# Patient Record
Sex: MALE | Race: WHITE | NOT HISPANIC OR LATINO | URBAN - METROPOLITAN AREA
[De-identification: names, ages, dates, MRNs, and addresses within clinical notes are randomized per-mention and may not be internally consistent; named-entity substitution may affect disease eponyms.]

---

## 2023-08-14 LAB
ALBUMIN (MG/L) IN URINE: 22.7 MG/L
ALBUMIN/CREATININE (UG/MG) IN URINE: 10.8 UG/MG CRT (ref 0–30)
CREATININE (MG/DL) IN URINE: 211 MG/DL (ref 20–370)
THYROTROPIN (MIU/L) IN SER/PLAS BY DETECTION LIMIT <= 0.05 MIU/L: 1.49 MIU/L (ref 0.44–3.98)

## 2023-11-20 ENCOUNTER — APPOINTMENT (OUTPATIENT)
Dept: PRIMARY CARE | Facility: CLINIC | Age: 68
End: 2023-11-20
Payer: MEDICARE

## 2023-12-01 ENCOUNTER — OFFICE VISIT (OUTPATIENT)
Dept: PRIMARY CARE | Facility: CLINIC | Age: 68
End: 2023-12-01
Payer: MEDICARE

## 2023-12-01 VITALS
HEART RATE: 76 BPM | SYSTOLIC BLOOD PRESSURE: 118 MMHG | RESPIRATION RATE: 16 BRPM | DIASTOLIC BLOOD PRESSURE: 80 MMHG | BODY MASS INDEX: 43.71 KG/M2 | WEIGHT: 277 LBS | TEMPERATURE: 97.4 F

## 2023-12-01 DIAGNOSIS — E03.9 HYPOTHYROIDISM, UNSPECIFIED TYPE: Primary | ICD-10-CM

## 2023-12-01 DIAGNOSIS — E66.01 CLASS 3 SEVERE OBESITY DUE TO EXCESS CALORIES WITH SERIOUS COMORBIDITY AND BODY MASS INDEX (BMI) OF 40.0 TO 44.9 IN ADULT (MULTI): ICD-10-CM

## 2023-12-01 DIAGNOSIS — E11.65 TYPE 2 DIABETES MELLITUS WITH HYPERGLYCEMIA, WITHOUT LONG-TERM CURRENT USE OF INSULIN (MULTI): ICD-10-CM

## 2023-12-01 LAB
POC FINGERSTICK BLOOD GLUCOSE: 125 MG/DL (ref 70–100)
POC HEMOGLOBIN A1C: 6.6 % (ref 4.2–6.5)

## 2023-12-01 PROCEDURE — 1159F MED LIST DOCD IN RCRD: CPT | Performed by: NURSE PRACTITIONER

## 2023-12-01 PROCEDURE — 3079F DIAST BP 80-89 MM HG: CPT | Performed by: NURSE PRACTITIONER

## 2023-12-01 PROCEDURE — 99213 OFFICE O/P EST LOW 20 MIN: CPT | Performed by: NURSE PRACTITIONER

## 2023-12-01 PROCEDURE — 1036F TOBACCO NON-USER: CPT | Performed by: NURSE PRACTITIONER

## 2023-12-01 PROCEDURE — 83036 HEMOGLOBIN GLYCOSYLATED A1C: CPT | Performed by: NURSE PRACTITIONER

## 2023-12-01 PROCEDURE — 82962 GLUCOSE BLOOD TEST: CPT | Performed by: NURSE PRACTITIONER

## 2023-12-01 PROCEDURE — 4010F ACE/ARB THERAPY RXD/TAKEN: CPT | Performed by: NURSE PRACTITIONER

## 2023-12-01 PROCEDURE — 3074F SYST BP LT 130 MM HG: CPT | Performed by: NURSE PRACTITIONER

## 2023-12-01 RX ORDER — ERGOCALCIFEROL 1.25 MG/1
CAPSULE ORAL
COMMUNITY
Start: 2021-08-25 | End: 2023-12-22 | Stop reason: SDUPTHER

## 2023-12-01 RX ORDER — GLIMEPIRIDE 4 MG/1
4 TABLET ORAL 2 TIMES DAILY
COMMUNITY
End: 2023-12-07 | Stop reason: SDUPTHER

## 2023-12-01 RX ORDER — LISINOPRIL 40 MG/1
40 TABLET ORAL DAILY
COMMUNITY
End: 2023-12-07 | Stop reason: SDUPTHER

## 2023-12-01 RX ORDER — HYDROCHLOROTHIAZIDE 25 MG/1
25 TABLET ORAL DAILY
COMMUNITY
End: 2023-12-07 | Stop reason: SDUPTHER

## 2023-12-01 RX ORDER — BLOOD SUGAR DIAGNOSTIC
STRIP MISCELLANEOUS
COMMUNITY
Start: 2022-03-11

## 2023-12-01 RX ORDER — PIOGLITAZONEHYDROCHLORIDE 30 MG/1
30 TABLET ORAL NIGHTLY
COMMUNITY
End: 2023-12-07 | Stop reason: SDUPTHER

## 2023-12-01 RX ORDER — LEVOTHYROXINE SODIUM 137 UG/1
TABLET ORAL
COMMUNITY
End: 2023-12-21 | Stop reason: SDUPTHER

## 2023-12-01 ASSESSMENT — ENCOUNTER SYMPTOMS
CONSTITUTIONAL NEGATIVE: 1
PSYCHIATRIC NEGATIVE: 1
GASTROINTESTINAL NEGATIVE: 1
NEUROLOGICAL NEGATIVE: 1
CARDIOVASCULAR NEGATIVE: 1
RESPIRATORY NEGATIVE: 1

## 2023-12-01 NOTE — PROGRESS NOTES
Jalil Camp is a 68 y.o. here for a diabetic follow up exam.     Pt states they are doing well. Following a low carbohydrate diet and is active.     Up to date with eye and foot exams, pcp visits.     Pulse 76   Temp 36.3 °C (97.4 °F)   Resp 16   Wt 126 kg (277 lb)   BMI 43.71 kg/m²      Bs today fbs is 125    Meds:  Actos 30mg nightly   Glimepiride 4mg po bid    Wt today 277 last 276  Bmi 43.7  Last aic 7.2  Aic today 6.6    Lab Results   Component Value Date    HGBA1C 6.6 (A) 12/01/2023    HGBA1C 7.2 (H) 08/22/2022    HGBA1C 6.7 06/03/2021    HGBA1C 7.0 03/11/2021      /80   Pulse 76   Temp 36.3 °C (97.4 °F)   Resp 16   Wt 126 kg (277 lb)   BMI 43.71 kg/m²    Lab Results   Component Value Date    POCGLU 125 (A) 12/01/2023    HGBA1C 7.2 (H) 08/22/2022    HGBA1C 6.7 06/03/2021    HGBA1C 7.0 03/11/2021   (    Current Outpatient Medications on File Prior to Visit   Medication Sig Dispense Refill    blood sugar diagnostic (Accu-Chek Nai Plus test strp) strip       ergocalciferol (Vitamin D-2) 1.25 MG (59968 UT) capsule Take by mouth.      glimepiride (Amaryl) 4 mg tablet Take 1 tablet (4 mg) by mouth 2 times a day.      hydroCHLOROthiazide (HYDRODiuril) 25 mg tablet Take 1 tablet (25 mg) by mouth once daily.      levothyroxine (Synthroid, Levoxyl) 137 mcg tablet TAKE 1 TABLET BY MOUTH EVERY MORNING. On sundays only TAKE 1/2 (ONE-HALF) OF A TABLET. No food or drink for one hour after taking.      lisinopril 40 mg tablet Take 1 tablet (40 mg) by mouth once daily.      pioglitazone (Actos) 30 mg tablet Take 1 tablet (30 mg) by mouth once daily at bedtime.       No current facility-administered medications on file prior to visit.      Review of Systems   Constitutional: Negative.    Respiratory: Negative.     Cardiovascular: Negative.    Gastrointestinal: Negative.    Skin: Negative.    Neurological: Negative.    Psychiatric/Behavioral: Negative.     All other systems reviewed and are negative.        Physical Exam  Vitals and nursing note reviewed.   Constitutional:       Appearance: Normal appearance.   HENT:      Head: Normocephalic.   Eyes:      Pupils: Pupils are equal, round, and reactive to light.   Cardiovascular:      Rate and Rhythm: Normal rate and regular rhythm.      Heart sounds: Normal heart sounds.   Pulmonary:      Effort: Pulmonary effort is normal.      Breath sounds: Normal breath sounds.   Skin:     General: Skin is warm and dry.   Neurological:      General: No focal deficit present.      Mental Status: He is alert and oriented to person, place, and time. Mental status is at baseline.   Psychiatric:         Mood and Affect: Mood normal.         Behavior: Behavior normal.         Thought Content: Thought content normal.         Judgment: Judgment normal.        Problem List Items Addressed This Visit    None  Visit Diagnoses         Codes    Hypothyroidism, unspecified type    -  Primary E03.9    Relevant Orders    Albumin , Urine Random    TSH    Type 2 diabetes mellitus with hyperglycemia, without long-term current use of insulin (CMS/Formerly [facility])     E11.65    Relevant Orders    POCT Fingerstick Glucose manually resulted (Completed)    POCT glycosylated hemoglobin (Hb A1C) manually resulted (Completed)    Class 3 severe obesity due to excess calories with serious comorbidity and body mass index (BMI) of 40.0 to 44.9 in adult (CMS/Formerly [facility])     E66.01, Z68.41

## 2023-12-05 RX ORDER — ROSUVASTATIN CALCIUM 5 MG/1
5 TABLET, COATED ORAL DAILY
Qty: 90 TABLET | Refills: 1 | Status: SHIPPED | OUTPATIENT
Start: 2023-12-05 | End: 2023-12-22 | Stop reason: SDUPTHER

## 2023-12-07 DIAGNOSIS — E11.65 TYPE 2 DIABETES MELLITUS WITH HYPERGLYCEMIA, WITHOUT LONG-TERM CURRENT USE OF INSULIN (MULTI): Primary | ICD-10-CM

## 2023-12-07 RX ORDER — LISINOPRIL 40 MG/1
40 TABLET ORAL DAILY
Qty: 90 TABLET | Refills: 0 | Status: SHIPPED | OUTPATIENT
Start: 2023-12-07 | End: 2023-12-22 | Stop reason: SDUPTHER

## 2023-12-07 RX ORDER — HYDROCHLOROTHIAZIDE 25 MG/1
25 TABLET ORAL DAILY
Qty: 90 TABLET | Refills: 0 | Status: SHIPPED | OUTPATIENT
Start: 2023-12-07 | End: 2023-12-22 | Stop reason: SDUPTHER

## 2023-12-07 RX ORDER — GLIMEPIRIDE 4 MG/1
4 TABLET ORAL 2 TIMES DAILY
Qty: 180 TABLET | Refills: 0 | Status: SHIPPED | OUTPATIENT
Start: 2023-12-07 | End: 2023-12-22 | Stop reason: SDUPTHER

## 2023-12-07 RX ORDER — PIOGLITAZONEHYDROCHLORIDE 30 MG/1
30 TABLET ORAL NIGHTLY
Qty: 90 TABLET | Refills: 0 | Status: SHIPPED | OUTPATIENT
Start: 2023-12-07 | End: 2023-12-22 | Stop reason: SDUPTHER

## 2023-12-07 NOTE — TELEPHONE ENCOUNTER
GLIMEPIRIDE 4MG  LISINOPRIL 40MG  HYDROCHLOROTHIAZIDE 25MG  ACTOS 30MG    [facility]  NEEDS TODAY WILL BE OUT

## 2023-12-08 ENCOUNTER — LAB (OUTPATIENT)
Dept: LAB | Facility: LAB | Age: 68
End: 2023-12-08
Payer: MEDICARE

## 2023-12-08 DIAGNOSIS — E03.9 HYPOTHYROIDISM, UNSPECIFIED TYPE: ICD-10-CM

## 2023-12-08 LAB
CREAT UR-MCNC: 127.9 MG/DL (ref 20–370)
MICROALBUMIN UR-MCNC: 21.4 MG/L
MICROALBUMIN/CREAT UR: 16.7 UG/MG CREAT
TSH SERPL-ACNC: 1.98 MIU/L (ref 0.44–3.98)

## 2023-12-08 PROCEDURE — 82570 ASSAY OF URINE CREATININE: CPT

## 2023-12-08 PROCEDURE — 84443 ASSAY THYROID STIM HORMONE: CPT

## 2023-12-08 PROCEDURE — 82043 UR ALBUMIN QUANTITATIVE: CPT

## 2023-12-08 PROCEDURE — 36415 COLL VENOUS BLD VENIPUNCTURE: CPT

## 2023-12-21 DIAGNOSIS — E03.9 HYPOTHYROIDISM, UNSPECIFIED TYPE: Primary | ICD-10-CM

## 2023-12-21 RX ORDER — LEVOTHYROXINE SODIUM 137 UG/1
TABLET ORAL
Qty: 90 TABLET | Refills: 0 | Status: SHIPPED | OUTPATIENT
Start: 2023-12-21 | End: 2023-12-22 | Stop reason: SDUPTHER

## 2023-12-22 DIAGNOSIS — E11.65 TYPE 2 DIABETES MELLITUS WITH HYPERGLYCEMIA, WITHOUT LONG-TERM CURRENT USE OF INSULIN (MULTI): ICD-10-CM

## 2023-12-22 DIAGNOSIS — E03.9 HYPOTHYROIDISM, UNSPECIFIED TYPE: ICD-10-CM

## 2023-12-22 RX ORDER — GLIMEPIRIDE 4 MG/1
4 TABLET ORAL 2 TIMES DAILY
Qty: 180 TABLET | Refills: 2 | Status: SHIPPED | OUTPATIENT
Start: 2023-12-22 | End: 2024-03-01 | Stop reason: SDUPTHER

## 2023-12-22 RX ORDER — LEVOTHYROXINE SODIUM 137 UG/1
TABLET ORAL
Qty: 90 TABLET | Refills: 2 | Status: SHIPPED | OUTPATIENT
Start: 2023-12-22 | End: 2024-03-01 | Stop reason: SDUPTHER

## 2023-12-22 RX ORDER — PIOGLITAZONEHYDROCHLORIDE 30 MG/1
30 TABLET ORAL NIGHTLY
Qty: 90 TABLET | Refills: 2 | Status: SHIPPED | OUTPATIENT
Start: 2023-12-22 | End: 2024-03-01 | Stop reason: SDUPTHER

## 2023-12-22 RX ORDER — HYDROCHLOROTHIAZIDE 25 MG/1
25 TABLET ORAL DAILY
Qty: 90 TABLET | Refills: 2 | Status: SHIPPED | OUTPATIENT
Start: 2023-12-22 | End: 2024-03-01 | Stop reason: SDUPTHER

## 2023-12-22 RX ORDER — ROSUVASTATIN CALCIUM 5 MG/1
5 TABLET, COATED ORAL DAILY
Qty: 90 TABLET | Refills: 2 | Status: SHIPPED | OUTPATIENT
Start: 2023-12-22 | End: 2024-03-01 | Stop reason: SDUPTHER

## 2023-12-22 RX ORDER — LISINOPRIL 40 MG/1
40 TABLET ORAL DAILY
Qty: 90 TABLET | Refills: 2 | Status: SHIPPED | OUTPATIENT
Start: 2023-12-22 | End: 2024-03-01 | Stop reason: SDUPTHER

## 2023-12-22 RX ORDER — ERGOCALCIFEROL 1.25 MG/1
50000 CAPSULE ORAL
Qty: 12 CAPSULE | Refills: 2 | Status: SHIPPED | OUTPATIENT
Start: 2023-12-22 | End: 2024-03-01 | Stop reason: SDUPTHER

## 2024-03-01 ENCOUNTER — OFFICE VISIT (OUTPATIENT)
Dept: PRIMARY CARE | Facility: CLINIC | Age: 69
End: 2024-03-01
Payer: MEDICARE

## 2024-03-01 VITALS
WEIGHT: 281 LBS | HEART RATE: 75 BPM | BODY MASS INDEX: 41.62 KG/M2 | TEMPERATURE: 97.5 F | DIASTOLIC BLOOD PRESSURE: 80 MMHG | HEIGHT: 69 IN | SYSTOLIC BLOOD PRESSURE: 120 MMHG

## 2024-03-01 DIAGNOSIS — E66.01 CLASS 3 SEVERE OBESITY DUE TO EXCESS CALORIES WITH SERIOUS COMORBIDITY AND BODY MASS INDEX (BMI) OF 40.0 TO 44.9 IN ADULT (MULTI): ICD-10-CM

## 2024-03-01 DIAGNOSIS — E03.9 HYPOTHYROIDISM, UNSPECIFIED TYPE: ICD-10-CM

## 2024-03-01 DIAGNOSIS — E11.65 TYPE 2 DIABETES MELLITUS WITH HYPERGLYCEMIA, WITHOUT LONG-TERM CURRENT USE OF INSULIN (MULTI): Primary | ICD-10-CM

## 2024-03-01 LAB
POC FINGERSTICK BLOOD GLUCOSE: 158 MG/DL (ref 70–100)
POC HEMOGLOBIN A1C: 6.9 % (ref 4.2–6.5)

## 2024-03-01 PROCEDURE — 4010F ACE/ARB THERAPY RXD/TAKEN: CPT | Performed by: NURSE PRACTITIONER

## 2024-03-01 PROCEDURE — 3079F DIAST BP 80-89 MM HG: CPT | Performed by: NURSE PRACTITIONER

## 2024-03-01 PROCEDURE — 3074F SYST BP LT 130 MM HG: CPT | Performed by: NURSE PRACTITIONER

## 2024-03-01 PROCEDURE — 1160F RVW MEDS BY RX/DR IN RCRD: CPT | Performed by: NURSE PRACTITIONER

## 2024-03-01 PROCEDURE — 3008F BODY MASS INDEX DOCD: CPT | Performed by: NURSE PRACTITIONER

## 2024-03-01 PROCEDURE — 83036 HEMOGLOBIN GLYCOSYLATED A1C: CPT | Performed by: NURSE PRACTITIONER

## 2024-03-01 PROCEDURE — 99213 OFFICE O/P EST LOW 20 MIN: CPT | Performed by: NURSE PRACTITIONER

## 2024-03-01 PROCEDURE — 82962 GLUCOSE BLOOD TEST: CPT | Performed by: NURSE PRACTITIONER

## 2024-03-01 PROCEDURE — 1159F MED LIST DOCD IN RCRD: CPT | Performed by: NURSE PRACTITIONER

## 2024-03-01 PROCEDURE — 1036F TOBACCO NON-USER: CPT | Performed by: NURSE PRACTITIONER

## 2024-03-01 RX ORDER — LISINOPRIL 40 MG/1
40 TABLET ORAL DAILY
Qty: 90 TABLET | Refills: 2 | Status: SHIPPED | OUTPATIENT
Start: 2024-03-01

## 2024-03-01 RX ORDER — PIOGLITAZONEHYDROCHLORIDE 30 MG/1
30 TABLET ORAL NIGHTLY
Qty: 90 TABLET | Refills: 2 | Status: SHIPPED | OUTPATIENT
Start: 2024-03-01

## 2024-03-01 RX ORDER — ERGOCALCIFEROL 1.25 MG/1
50000 CAPSULE ORAL
Qty: 12 CAPSULE | Refills: 2 | Status: SHIPPED | OUTPATIENT
Start: 2024-03-01

## 2024-03-01 RX ORDER — HYDROCHLOROTHIAZIDE 25 MG/1
25 TABLET ORAL DAILY
Qty: 90 TABLET | Refills: 2 | Status: SHIPPED | OUTPATIENT
Start: 2024-03-01

## 2024-03-01 RX ORDER — GLIMEPIRIDE 4 MG/1
4 TABLET ORAL 2 TIMES DAILY
Qty: 180 TABLET | Refills: 2 | Status: SHIPPED | OUTPATIENT
Start: 2024-03-01

## 2024-03-01 RX ORDER — LEVOTHYROXINE SODIUM 137 UG/1
TABLET ORAL
Qty: 90 TABLET | Refills: 2 | Status: SHIPPED | OUTPATIENT
Start: 2024-03-01

## 2024-03-01 RX ORDER — ROSUVASTATIN CALCIUM 5 MG/1
5 TABLET, COATED ORAL DAILY
Qty: 90 TABLET | Refills: 2 | Status: SHIPPED | OUTPATIENT
Start: 2024-03-01 | End: 2024-08-28

## 2024-03-01 ASSESSMENT — ENCOUNTER SYMPTOMS
ENDOCRINE NEGATIVE: 1
NEUROLOGICAL NEGATIVE: 1
CARDIOVASCULAR NEGATIVE: 1
PSYCHIATRIC NEGATIVE: 1
RESPIRATORY NEGATIVE: 1
GASTROINTESTINAL NEGATIVE: 1
MUSCULOSKELETAL NEGATIVE: 1
CONSTITUTIONAL NEGATIVE: 1

## 2024-03-01 NOTE — PROGRESS NOTES
Jalil Camp is a 68 y.o. here for a diabetic follow up exam.     Pt states they are doing well. Following a low carbohydrate diet and is active.     Up to date with eye and foot exams, pcp visits.     Last aic 6.6  Last wt 277 - today 281  Aic 6.9 today goal under 7    Tsh check normal 12/23  Urine micro check 12/23 normal  Due to see pcp dr urbina will sched today  Reminded to get eye and foot checks    Meds:  Actos 30mg daily  Glimeperide 4mg po bid    Lab Results   Component Value Date    POCGLU 125 (A) 12/01/2023    HGBA1C 6.6 (A) 12/01/2023    HGBA1C 7.2 (H) 08/22/2022    HGBA1C 6.7 06/03/2021    HGBA1C 7.0 03/11/2021     There were no vitals taken for this visit.   Wt Readings from Last 5 Encounters:   12/01/23 126 kg (277 lb)   09/14/22 125 kg (276 lb 2 oz)   08/31/22 126 kg (277 lb 4 oz)   08/12/21 124 kg (272 lb 6 oz)          Review of Systems   Constitutional: Negative.    HENT: Negative.     Respiratory: Negative.     Cardiovascular: Negative.    Gastrointestinal: Negative.    Endocrine: Negative.    Genitourinary: Negative.    Musculoskeletal: Negative.    Skin: Negative.    Neurological: Negative.    Psychiatric/Behavioral: Negative.          Physical Exam  Vitals and nursing note reviewed.   Constitutional:       Appearance: Normal appearance.   HENT:      Head: Normocephalic.   Eyes:      Pupils: Pupils are equal, round, and reactive to light.   Cardiovascular:      Rate and Rhythm: Normal rate and regular rhythm.      Heart sounds: Normal heart sounds.   Pulmonary:      Effort: Pulmonary effort is normal.      Breath sounds: Normal breath sounds.   Skin:     General: Skin is warm and dry.   Neurological:      General: No focal deficit present.      Mental Status: He is alert and oriented to person, place, and time. Mental status is at baseline.   Psychiatric:         Mood and Affect: Mood normal.         Behavior: Behavior normal.         Thought Content: Thought content normal.          Judgment: Judgment normal.    Visit Vitals  /80   Pulse 75   Temp 36.4 °C (97.5 °F)      Vitals:    03/01/24 0807   Weight: 127 kg (281 lb)      Problem List Items Addressed This Visit    None  Visit Diagnoses         Codes    Type 2 diabetes mellitus with hyperglycemia, without long-term current use of insulin (CMS/HCC)    -  Primary E11.65    Relevant Orders    POCT glycosylated hemoglobin (Hb A1C) manually resulted    POCT fingerstick glucose manually resulted    Class 3 severe obesity due to excess calories with serious comorbidity and body mass index (BMI) of 40.0 to 44.9 in adult (CMS/Formerly [facility])     E66.01, Z68.41    Hypothyroidism, unspecified type     E03.9                Laura L Seaver, APRN-CNP

## 2024-05-17 ENCOUNTER — OFFICE VISIT (OUTPATIENT)
Dept: PRIMARY CARE | Facility: CLINIC | Age: 69
End: 2024-05-17
Payer: MEDICARE

## 2024-05-17 VITALS
WEIGHT: 278.8 LBS | BODY MASS INDEX: 41.29 KG/M2 | DIASTOLIC BLOOD PRESSURE: 72 MMHG | HEIGHT: 69 IN | HEART RATE: 76 BPM | SYSTOLIC BLOOD PRESSURE: 132 MMHG | TEMPERATURE: 97.4 F | OXYGEN SATURATION: 97 %

## 2024-05-17 DIAGNOSIS — M79.89 RIGHT LEG SWELLING: Primary | ICD-10-CM

## 2024-05-17 PROCEDURE — 1036F TOBACCO NON-USER: CPT | Performed by: INTERNAL MEDICINE

## 2024-05-17 PROCEDURE — 99214 OFFICE O/P EST MOD 30 MIN: CPT | Performed by: INTERNAL MEDICINE

## 2024-05-17 PROCEDURE — 1159F MED LIST DOCD IN RCRD: CPT | Performed by: INTERNAL MEDICINE

## 2024-05-17 PROCEDURE — 3008F BODY MASS INDEX DOCD: CPT | Performed by: INTERNAL MEDICINE

## 2024-05-17 PROCEDURE — 1160F RVW MEDS BY RX/DR IN RCRD: CPT | Performed by: INTERNAL MEDICINE

## 2024-05-17 NOTE — PROGRESS NOTES
"Subjective   Patient ID: Jalil Camp is a 68 y.o. male who presents for Leg Pain (Right leg ).    Mr. Jalil Camp is a pleasant 69 yo M with history of DM, HTN, hypothyroidism, who was incidentally found to have an irregularly-shaped/spiculated and partially calcified mass at the small bowel mesentery. Feature consistent with carcinoid. He underwent small bowel resection on 1/5/2023 by Dr. Mayur Graf. Path showed well-differentiated neuroendocrine tumor, grade 1, large mesenteric mass deposit, 1/7 LN involved, clear margin, pT3 pN2 M0, stage III    He is having right leg cramps for 2 to 3 weeks, and after mowing lawn on Monday, he and his wife noticed  increased size of right leg , he denies fever,   Chest pain , dyspnea, prior h/o blood clots, not on any OAC med , and no h/o bleeding tendency , PUD , no h/o DVT  He does not smoke. He can walk without difficulty  He had negative venous doppler study of LE on 8/23/23         Review of Systems  See HPI    Objective   /72 (BP Location: Left arm, Patient Position: Sitting, BP Cuff Size: Adult)   Pulse 76   Temp 36.3 °C (97.4 °F) (Temporal)   Ht 1.74 m (5' 8.5\")   Wt 126 kg (278 lb 12.8 oz)   SpO2 97%   BMI 41.77 kg/m²     Physical Exam  Constitutional:       Appearance: He is obese.   Cardiovascular:      Rate and Rhythm: Normal rate and regular rhythm.      Pulses: Normal pulses.      Heart sounds: Normal heart sounds.   Pulmonary:      Effort: Pulmonary effort is normal.      Breath sounds: Normal breath sounds. No wheezing.   Abdominal:      General: Abdomen is flat. Bowel sounds are normal.      Palpations: Abdomen is soft.   Musculoskeletal:         General: No tenderness. Normal range of motion.      Right lower leg: Edema present.      Left lower leg: No edema.      Comments: Larger right leg size, nonpitting edema, No calf tenderness, no cords palpable  He has varicose veins  Addison's sign is negative   Neurological:      Mental Status: He " is alert.         Assessment/Plan        Right leg swelling , concern for DVT   Recent SB resection s/p neuroendocrine tumor     Check venous doppler study of RLE   May wear knee high stockings  Reassurance    Oncology folllowup  , after SB resection   He follows with [facility] Ferr[facility] at [facility]

## 2024-06-03 ENCOUNTER — APPOINTMENT (OUTPATIENT)
Dept: PRIMARY CARE | Facility: CLINIC | Age: 69
End: 2024-06-03
Payer: MEDICARE

## 2024-06-07 ENCOUNTER — OFFICE VISIT (OUTPATIENT)
Dept: PRIMARY CARE | Facility: CLINIC | Age: 69
End: 2024-06-07
Payer: MEDICARE

## 2024-06-07 VITALS
SYSTOLIC BLOOD PRESSURE: 130 MMHG | TEMPERATURE: 97.7 F | WEIGHT: 279 LBS | DIASTOLIC BLOOD PRESSURE: 80 MMHG | HEART RATE: 82 BPM | BODY MASS INDEX: 41.32 KG/M2 | HEIGHT: 69 IN | OXYGEN SATURATION: 98 %

## 2024-06-07 DIAGNOSIS — E11.65 TYPE 2 DIABETES MELLITUS WITH HYPERGLYCEMIA, WITHOUT LONG-TERM CURRENT USE OF INSULIN (MULTI): Primary | ICD-10-CM

## 2024-06-07 LAB
POC FINGERSTICK BLOOD GLUCOSE: 197 MG/DL (ref 70–100)
POC HEMOGLOBIN A1C: 6.9 % (ref 4.2–6.5)

## 2024-06-07 PROCEDURE — 99213 OFFICE O/P EST LOW 20 MIN: CPT | Performed by: NURSE PRACTITIONER

## 2024-06-07 PROCEDURE — 3075F SYST BP GE 130 - 139MM HG: CPT | Performed by: NURSE PRACTITIONER

## 2024-06-07 PROCEDURE — 83036 HEMOGLOBIN GLYCOSYLATED A1C: CPT | Performed by: NURSE PRACTITIONER

## 2024-06-07 PROCEDURE — 82962 GLUCOSE BLOOD TEST: CPT | Performed by: NURSE PRACTITIONER

## 2024-06-07 PROCEDURE — 3008F BODY MASS INDEX DOCD: CPT | Performed by: NURSE PRACTITIONER

## 2024-06-07 PROCEDURE — 3079F DIAST BP 80-89 MM HG: CPT | Performed by: NURSE PRACTITIONER

## 2024-06-07 PROCEDURE — 4010F ACE/ARB THERAPY RXD/TAKEN: CPT | Performed by: NURSE PRACTITIONER

## 2024-06-07 PROCEDURE — 1159F MED LIST DOCD IN RCRD: CPT | Performed by: NURSE PRACTITIONER

## 2024-06-07 ASSESSMENT — ENCOUNTER SYMPTOMS
MUSCULOSKELETAL NEGATIVE: 1
ENDOCRINE NEGATIVE: 1
RESPIRATORY NEGATIVE: 1
CONSTITUTIONAL NEGATIVE: 1
NEUROLOGICAL NEGATIVE: 1
PSYCHIATRIC NEGATIVE: 1
CARDIOVASCULAR NEGATIVE: 1
GASTROINTESTINAL NEGATIVE: 1

## 2024-06-07 NOTE — PROGRESS NOTES
" Jalil Camp is a 68 y.o. here for a diabetic follow up exam.     Pt states they are doing well. Following a low carbohydrate diet and is active.     Up to date with eye and foot exams, pcp visits.    Last wt 278  - 279 today  last bmi 41.77   Last aic 6.9 (6.8, 7.2, 6.7) goal aic under 7  - 6.9 today  Reminded to get eye and foot care  Last urine micro normal 12/23  Overdue for pcp visit and reminded to make appt today  Meds:  Glimepiride 4mg po bid   Actos 30mg daily    Last tsh euthyroid on replacement normal range 12/23     Lab Results   Component Value Date    POCGLU 197 (A) 06/07/2024    POCGLU 158 (A) 03/01/2024    POCGLU 125 (A) 12/01/2023    HGBA1C 6.9 (A) 06/07/2024    HGBA1C 6.9 (A) 03/01/2024    HGBA1C 6.6 (A) 12/01/2023    HGBA1C 7.2 (H) 08/22/2022    HGBA1C 6.7 06/03/2021     /80   Pulse 82   Temp 36.5 °C (97.7 °F)   Ht 1.74 m (5' 8.5\")   Wt 127 kg (279 lb)   SpO2 98%   BMI 41.80 kg/m²    Wt Readings from Last 5 Encounters:   06/07/24 127 kg (279 lb)   05/17/24 126 kg (278 lb 12.8 oz)   03/01/24 127 kg (281 lb)   12/01/23 126 kg (277 lb)   09/14/22 125 kg (276 lb 2 oz)          Review of Systems   Constitutional: Negative.    HENT: Negative.     Respiratory: Negative.     Cardiovascular: Negative.    Gastrointestinal: Negative.    Endocrine: Negative.    Genitourinary: Negative.    Musculoskeletal: Negative.    Skin: Negative.    Neurological: Negative.    Psychiatric/Behavioral: Negative.          Physical Exam  Vitals and nursing note reviewed.   Constitutional:       Appearance: Normal appearance.   HENT:      Head: Normocephalic.   Eyes:      Pupils: Pupils are equal, round, and reactive to light.   Cardiovascular:      Rate and Rhythm: Normal rate and regular rhythm.      Heart sounds: Normal heart sounds.   Pulmonary:      Effort: Pulmonary effort is normal.      Breath sounds: Normal breath sounds.   Skin:     General: Skin is warm and dry.   Neurological:      General: No focal " deficit present.      Mental Status: He is alert and oriented to person, place, and time. Mental status is at baseline.   Psychiatric:         Mood and Affect: Mood normal.         Behavior: Behavior normal.         Thought Content: Thought content normal.         Judgment: Judgment normal.        Problem List Items Addressed This Visit    None  Visit Diagnoses       Type 2 diabetes mellitus with hyperglycemia, without long-term current use of insulin (Multi)    -  Primary    Relevant Orders    POCT glycosylated hemoglobin (Hb A1C) manually resulted (Completed)    POCT fingerstick glucose manually resulted (Completed)             Laura L Seaver, APRN-CNP

## 2024-06-20 DIAGNOSIS — E11.65 TYPE 2 DIABETES MELLITUS WITH HYPERGLYCEMIA, WITHOUT LONG-TERM CURRENT USE OF INSULIN (MULTI): ICD-10-CM

## 2024-06-20 RX ORDER — ROSUVASTATIN CALCIUM 5 MG/1
5 TABLET, COATED ORAL DAILY
Qty: 90 TABLET | Refills: 2 | Status: SHIPPED | OUTPATIENT
Start: 2024-06-20 | End: 2024-12-17

## 2024-06-21 ENCOUNTER — TELEPHONE (OUTPATIENT)
Dept: PRIMARY CARE | Facility: CLINIC | Age: 69
End: 2024-06-21
Payer: MEDICARE

## 2024-06-21 NOTE — TELEPHONE ENCOUNTER
----- Message from Laura L Seaver, APRN-CNP sent at 6/20/2024  9:12 AM EDT -----  Regarding: RE: Statin in Diabetes  Pls inform pt  Pharmacist review says his crestor/rosuvastatin is not being filled. Please leave pt a message for pt if any issues with the cholesterol medication give us a call back, otherwise sent to pharm incase he ran out.     Ty lsnp      ----- Message -----  From: Alejandra Leigh PharmD  Sent: 6/18/2024  10:46 AM EDT  To: Bhumika Bowen MD; Tasia Mott PharmD  Subject: Statin in Diabetes                               Hi Dr. Bowen,    I am a pharmacist performing chart reviews for patients with adherence and statin opportunities. I see that the patient      - Visit scheduled 7/22/24      - Has diabetes on problem list and rosuvastatin on med list but no filled claims at the pharmacy in 2024 for statin        If appropriate, could you please discuss the cardiovascular benefit of statin in diabetes or evaluate for exclusion. Exclusions (requires a submitted code for each calendar year): End stage renal disease, hospice, rhabdomyolysis or myopathy, cirrhosis, and pre-diabetes.     Thank you for your consideration,   Jelly Leigh, TangD

## 2024-07-22 ENCOUNTER — OFFICE VISIT (OUTPATIENT)
Dept: PODIATRY | Facility: CLINIC | Age: 69
End: 2024-07-22
Payer: MEDICARE

## 2024-07-22 ENCOUNTER — APPOINTMENT (OUTPATIENT)
Dept: PRIMARY CARE | Facility: CLINIC | Age: 69
End: 2024-07-22
Payer: MEDICARE

## 2024-07-22 VITALS
TEMPERATURE: 97.7 F | HEART RATE: 84 BPM | HEIGHT: 68 IN | BODY MASS INDEX: 41.77 KG/M2 | DIASTOLIC BLOOD PRESSURE: 80 MMHG | WEIGHT: 275.6 LBS | OXYGEN SATURATION: 97 % | SYSTOLIC BLOOD PRESSURE: 130 MMHG

## 2024-07-22 DIAGNOSIS — M76.61 TENDONITIS, ACHILLES, RIGHT: Primary | ICD-10-CM

## 2024-07-22 DIAGNOSIS — M79.675 TOE PAIN, LEFT: ICD-10-CM

## 2024-07-22 DIAGNOSIS — E78.5 HYPERLIPIDEMIA, UNSPECIFIED HYPERLIPIDEMIA TYPE: ICD-10-CM

## 2024-07-22 DIAGNOSIS — M25.571 PAIN IN RIGHT ANKLE AND JOINTS OF RIGHT FOOT: ICD-10-CM

## 2024-07-22 DIAGNOSIS — M79.674 TOE PAIN, RIGHT: ICD-10-CM

## 2024-07-22 DIAGNOSIS — E03.9 HYPOTHYROIDISM, UNSPECIFIED TYPE: ICD-10-CM

## 2024-07-22 DIAGNOSIS — I10 ESSENTIAL HYPERTENSION: ICD-10-CM

## 2024-07-22 DIAGNOSIS — L60.2 OVERGROWN TOENAILS: ICD-10-CM

## 2024-07-22 DIAGNOSIS — Z00.00 ROUTINE GENERAL MEDICAL EXAMINATION AT HEALTH CARE FACILITY: Primary | ICD-10-CM

## 2024-07-22 DIAGNOSIS — E11.00 TYPE 2 DIABETES MELLITUS WITH HYPEROSMOLARITY WITHOUT COMA, WITHOUT LONG-TERM CURRENT USE OF INSULIN (MULTI): ICD-10-CM

## 2024-07-22 DIAGNOSIS — E55.9 VITAMIN D DEFICIENCY: ICD-10-CM

## 2024-07-22 DIAGNOSIS — E11.9 DIABETES MELLITUS WITHOUT COMPLICATION (MULTI): ICD-10-CM

## 2024-07-22 DIAGNOSIS — B35.1 TINEA UNGUIUM: ICD-10-CM

## 2024-07-22 PROBLEM — U07.1 COVID-19 VIRUS INFECTION: Status: RESOLVED | Noted: 2022-09-06 | Resolved: 2024-07-22

## 2024-07-22 PROBLEM — F17.200 NICOTINE USE DISORDER: Status: RESOLVED | Noted: 2022-08-22 | Resolved: 2024-07-22

## 2024-07-22 PROBLEM — D3A.8: Status: ACTIVE | Noted: 2023-01-05

## 2024-07-22 PROBLEM — C7A.8 OTHER MALIGNANT NEUROENDOCRINE TUMORS (MULTI): Status: ACTIVE | Noted: 2024-07-22

## 2024-07-22 PROBLEM — L03.90 CELLULITIS: Status: RESOLVED | Noted: 2024-07-22 | Resolved: 2024-07-22

## 2024-07-22 PROBLEM — N28.9 KIDNEY LESION: Status: ACTIVE | Noted: 2024-07-22

## 2024-07-22 PROBLEM — K60.2 ANAL FISSURE: Status: RESOLVED | Noted: 2017-04-03 | Resolved: 2024-07-22

## 2024-07-22 PROBLEM — R19.7 DIARRHEA: Status: RESOLVED | Noted: 2022-09-06 | Resolved: 2024-07-22

## 2024-07-22 PROBLEM — I45.2 RBBB (RIGHT BUNDLE BRANCH BLOCK WITH LEFT ANTERIOR FASCICULAR BLOCK): Status: ACTIVE | Noted: 2024-07-22

## 2024-07-22 PROCEDURE — 11721 DEBRIDE NAIL 6 OR MORE: CPT | Performed by: PODIATRIST

## 2024-07-22 PROCEDURE — 1159F MED LIST DOCD IN RCRD: CPT | Performed by: PODIATRIST

## 2024-07-22 PROCEDURE — 99212 OFFICE O/P EST SF 10 MIN: CPT | Performed by: PODIATRIST

## 2024-07-22 PROCEDURE — 1123F ACP DISCUSS/DSCN MKR DOCD: CPT | Performed by: PODIATRIST

## 2024-07-22 PROCEDURE — 1160F RVW MEDS BY RX/DR IN RCRD: CPT | Performed by: PODIATRIST

## 2024-07-22 PROCEDURE — 3075F SYST BP GE 130 - 139MM HG: CPT | Performed by: FAMILY MEDICINE

## 2024-07-22 PROCEDURE — G0439 PPPS, SUBSEQ VISIT: HCPCS | Performed by: FAMILY MEDICINE

## 2024-07-22 PROCEDURE — 3008F BODY MASS INDEX DOCD: CPT | Performed by: FAMILY MEDICINE

## 2024-07-22 PROCEDURE — 1159F MED LIST DOCD IN RCRD: CPT | Performed by: FAMILY MEDICINE

## 2024-07-22 PROCEDURE — 1036F TOBACCO NON-USER: CPT | Performed by: PODIATRIST

## 2024-07-22 PROCEDURE — 1036F TOBACCO NON-USER: CPT | Performed by: FAMILY MEDICINE

## 2024-07-22 PROCEDURE — 3079F DIAST BP 80-89 MM HG: CPT | Performed by: FAMILY MEDICINE

## 2024-07-22 PROCEDURE — 1170F FXNL STATUS ASSESSED: CPT | Performed by: FAMILY MEDICINE

## 2024-07-22 PROCEDURE — 4010F ACE/ARB THERAPY RXD/TAKEN: CPT | Performed by: FAMILY MEDICINE

## 2024-07-22 PROCEDURE — 1123F ACP DISCUSS/DSCN MKR DOCD: CPT | Performed by: FAMILY MEDICINE

## 2024-07-22 PROCEDURE — 4010F ACE/ARB THERAPY RXD/TAKEN: CPT | Performed by: PODIATRIST

## 2024-07-22 ASSESSMENT — ACTIVITIES OF DAILY LIVING (ADL)
DRESSING: INDEPENDENT
BATHING: INDEPENDENT
DOING_HOUSEWORK: INDEPENDENT
TAKING_MEDICATION: INDEPENDENT
GROCERY_SHOPPING: INDEPENDENT
MANAGING_FINANCES: INDEPENDENT

## 2024-07-22 ASSESSMENT — PATIENT HEALTH QUESTIONNAIRE - PHQ9
2. FEELING DOWN, DEPRESSED OR HOPELESS: NOT AT ALL
SUM OF ALL RESPONSES TO PHQ9 QUESTIONS 1 AND 2: 0
1. LITTLE INTEREST OR PLEASURE IN DOING THINGS: NOT AT ALL

## 2024-07-22 ASSESSMENT — ENCOUNTER SYMPTOMS: TREMORS: 1

## 2024-07-22 NOTE — PROGRESS NOTES
"Subjective   Reason for Visit: Jalil Camp is an 69 y.o. male here for a Medicare Wellness visit.          Reviewed all medications by prescribing practitioner or clinical pharmacist (such as prescriptions, OTCs, herbal therapies and supplements) and documented in the medical record.  Has not seen eye doc.  HPI  He has been having tremors. He works in  and his hands are shaking more and having trouble writing.  Patient Care Team:  Bhumika Bowen MD as PCP - General (Family Medicine)  Laura L Seaver, APRN-CNP as PCP - [employer] PCP     Review of Systems   Neurological:  Positive for tremors.   All other systems reviewed and are negative.      Objective   Vitals:  /80 (BP Location: Left arm, Patient Position: Sitting, BP Cuff Size: Adult)   Pulse 84   Temp 36.5 °C (97.7 °F)   Ht 1.727 m (5' 8\")   Wt 125 kg (275 lb 9.6 oz)   SpO2 97%   BMI 41.90 kg/m²       Physical Exam  Constitutional:       Appearance: Normal appearance.   HENT:      Head: Normocephalic.      Right Ear: Tympanic membrane normal.      Nose: Nose normal.      Mouth/Throat:      Mouth: Mucous membranes are moist.   Eyes:      Pupils: Pupils are equal, round, and reactive to light.   Cardiovascular:      Rate and Rhythm: Normal rate and regular rhythm.      Pulses: Normal pulses.   Pulmonary:      Effort: Pulmonary effort is normal.   Abdominal:      General: Abdomen is flat.   Musculoskeletal:         General: Normal range of motion.      Cervical back: Normal range of motion.   Skin:     General: Skin is warm.      Comments: Toenails very thick and curling under his toes discolored   Neurological:      Mental Status: He is alert.   Psychiatric:         Mood and Affect: Mood normal.         Assessment/Plan   Problem List Items Addressed This Visit       Hyperlipidemia    Relevant Orders    Urinalysis with Reflex Microscopic    Lipid Panel    CBC and Auto Differential    Thyroid Stimulating " Hormone    Comprehensive Metabolic Panel    Hemoglobin A1c    Albumin-Creatinine Ratio, Urine Random    PSA, total and free    Essential hypertension    Relevant Orders    Urinalysis with Reflex Microscopic    Lipid Panel    CBC and Auto Differential    Thyroid Stimulating Hormone    Comprehensive Metabolic Panel    Hemoglobin A1c    Albumin-Creatinine Ratio, Urine Random    PSA, total and free    Hypothyroidism    Type 2 diabetes mellitus (Multi)    Relevant Orders    Hemoglobin A1c    Albumin-Creatinine Ratio, Urine Random    Vitamin D deficiency    Relevant Orders    PSA, total and free    Vitamin D 25-Hydroxy,Total (for eval of Vitamin D levels)     Other Visit Diagnoses       Routine general medical examination at health care facility    -  Primary    Overgrown toenails                    Toenails- pt being squeezed into Dr. Shoemaker's schedule today for toenail cutting. Can not let them stay in current condition because they are wrapping over the top of the toes

## 2024-07-22 NOTE — PROGRESS NOTES
CC:  painful thickened and elongated toenails and diabetic care.   Right achilles pain    HPI: Pt presents for dm foot exam and painful thickened and elongated toenails that are difficult to manage.  Onset was gradual with worsening course until recently.  Aggravated by shoe gear and ambulation. Also has pain right achilles tendon, no injuries are present.      PCP: Dr. Bowen  Last visit: 7-22-24     PMH  Past Medical History:   Diagnosis Date    Anal fissure 04/03/2017    Cellulitis of right lower limb 09/14/2022    Cellulitis of right leg    COVID-19 virus infection 09/06/2022    Diabetes mellitus (Multi)     Diarrhea 09/06/2022    Fatty liver     High cholesterol     Hypertension     Nicotine use disorder 08/22/2022    Onychomycosis of toenail 07/22/2024    Small intestine cancer (Multi)      MEDS    Current Outpatient Medications:     blood sugar diagnostic (Accu-Chek Nai Plus test strp) strip, , Disp: , Rfl:     ergocalciferol (Vitamin D-2) 1.25 MG (91804 UT) capsule, Take 1 capsule (50,000 Units) by mouth every 30 (thirty) days. (Patient not taking: Reported on 7/22/2024), Disp: 12 capsule, Rfl: 2    glimepiride (Amaryl) 4 mg tablet, Take 1 tablet (4 mg) by mouth 2 times a day., Disp: 180 tablet, Rfl: 2    hydroCHLOROthiazide (HYDRODiuril) 25 mg tablet, Take 1 tablet (25 mg) by mouth once daily., Disp: 90 tablet, Rfl: 2    levothyroxine (Synthroid, Levoxyl) 137 mcg tablet, TAKE 1 TABLET BY MOUTH EVERY MORNING. On sundays only TAKE 1/2 (ONE-HALF) OF A TABLET. No food or drink for one hour after taking., Disp: 90 tablet, Rfl: 2    lisinopril 40 mg tablet, Take 1 tablet (40 mg) by mouth once daily., Disp: 90 tablet, Rfl: 2    pioglitazone (Actos) 30 mg tablet, Take 1 tablet (30 mg) by mouth once daily at bedtime., Disp: 90 tablet, Rfl: 2  Allergies  Allergies   Allergen Reactions    Penicillins Unknown and Other     Syncope    Causes patient to pass out.    fainting    Metformin Unknown     diarr lsnp      Social History     Socioeconomic History    Marital status:    Tobacco Use    Smoking status: Never    Smokeless tobacco: Never   Substance and Sexual Activity    Alcohol use: Not Currently    Drug use: Never    Sexual activity: Yes     Family History   Problem Relation Name Age of Onset    Diabetes Mother      Cancer Brother      Heart disease Brother      Heart disease Maternal Grandfather       Past Surgical History:   Procedure Laterality Date    OTHER SURGICAL HISTORY  10/10/2020    Gallbladder surgery    OTHER SURGICAL HISTORY  09/14/2022    Tonsillectomy    SMALL INTESTINE SURGERY         REVIEW OF SYSTEMS  DERM:   + as noted in HPI.       Physical examination:   On General Observation: Patient is a pleasant, cooperative, well developed 69 y.o. diabetic   adult male. The patient is alert and oriented to time, place and person. Patient has normal affect and mood.  There were no vitals taken for this visit.    Vascular:  DP and PT pulses are 2/4 b/l. mild edema noted. mild varicosities b/l.  CFT  5 seconds to all digits bilateral.  Skin temperature is warm to warm from proximal to distal bilateral.      Muscular: Strength is 5/5 for all instrinsic and extrinsic muscle groups.     Neuro:  Proprioception present.   Sensation to vibration is  present. Protective sensation intact  at all pedal sites via [place] 5.07 monofilament bilateral.  Light touch present bilateral.     Derm:    Left toenails: 1-5 Brittleness, crumbling upon debridement, subungual debris, elongation, mycotic appearance, tenderness, and thickness.   Right toenails: 1-5 Brittleness, crumbling upon debridement, subungual debris, elongation, mycotic appearance, tenderness, and thickness.   Hair growth is decreased b/l le    Ortho:  Pain is present posterior heel insertion of the achiiles tendon, decreased in AJ df is present with the knee flexed and extnded.    ASSESSMENT:    Achiiles tendonitis right   Pain foot/ankle  Tinea  Unguium [B35.1]   DM  Pain in right toe(s) [M79.674]   Pain in left toe(s) [M79.675]       PLAN:   Exam  Reviewed etiology of achilles tendonitis as well as treatment plan, will start price and hep, handout given, he will not wear cam walker, will obtain xrays, recommend cork heel lifts  A comprehensive history and physical examination were preformed. DM foot care and DM foot manifestations were reviewed.  The patient was educated on clinical findings, diagnosis and treatment plans. Patient understands all that has been explained and all questions were answered to apparent satisfaction.     - Debrided toenails 1-10 in length and height.   - Follow up in 9-12 weeks.       Cooper Shoemaker DPM

## 2024-07-26 ENCOUNTER — LAB (OUTPATIENT)
Dept: LAB | Facility: LAB | Age: 69
End: 2024-07-26
Payer: MEDICARE

## 2024-07-26 DIAGNOSIS — E55.9 VITAMIN D DEFICIENCY: ICD-10-CM

## 2024-07-26 DIAGNOSIS — I10 ESSENTIAL HYPERTENSION: ICD-10-CM

## 2024-07-26 DIAGNOSIS — E11.00 TYPE 2 DIABETES MELLITUS WITH HYPEROSMOLARITY WITHOUT COMA, WITHOUT LONG-TERM CURRENT USE OF INSULIN (MULTI): ICD-10-CM

## 2024-07-26 DIAGNOSIS — E78.5 HYPERLIPIDEMIA, UNSPECIFIED HYPERLIPIDEMIA TYPE: ICD-10-CM

## 2024-07-26 LAB
25(OH)D3 SERPL-MCNC: 8 NG/ML (ref 30–100)
ALBUMIN SERPL BCP-MCNC: 4 G/DL (ref 3.4–5)
ALP SERPL-CCNC: 47 U/L (ref 33–136)
ALT SERPL W P-5'-P-CCNC: 10 U/L (ref 10–52)
ANION GAP SERPL CALC-SCNC: 10 MMOL/L (ref 10–20)
APPEARANCE UR: CLEAR
AST SERPL W P-5'-P-CCNC: 12 U/L (ref 9–39)
BASOPHILS # BLD AUTO: 0.04 X10*3/UL (ref 0–0.1)
BASOPHILS NFR BLD AUTO: 0.6 %
BILIRUB SERPL-MCNC: 1.4 MG/DL (ref 0–1.2)
BILIRUB UR STRIP.AUTO-MCNC: NEGATIVE MG/DL
BUN SERPL-MCNC: 18 MG/DL (ref 6–23)
CALCIUM SERPL-MCNC: 8.8 MG/DL (ref 8.6–10.3)
CHLORIDE SERPL-SCNC: 101 MMOL/L (ref 98–107)
CHOLEST SERPL-MCNC: 142 MG/DL (ref 0–199)
CHOLESTEROL/HDL RATIO: 4.6
CO2 SERPL-SCNC: 28 MMOL/L (ref 21–32)
COLOR UR: NORMAL
CREAT SERPL-MCNC: 1.26 MG/DL (ref 0.5–1.3)
CREAT UR-MCNC: 91.4 MG/DL (ref 20–370)
EGFRCR SERPLBLD CKD-EPI 2021: 62 ML/MIN/1.73M*2
EOSINOPHIL # BLD AUTO: 0.11 X10*3/UL (ref 0–0.7)
EOSINOPHIL NFR BLD AUTO: 1.7 %
ERYTHROCYTE [DISTWIDTH] IN BLOOD BY AUTOMATED COUNT: 16.3 % (ref 11.5–14.5)
GLUCOSE SERPL-MCNC: 127 MG/DL (ref 74–99)
GLUCOSE UR STRIP.AUTO-MCNC: NORMAL MG/DL
HCT VFR BLD AUTO: 40.7 % (ref 41–52)
HDLC SERPL-MCNC: 31.1 MG/DL
HGB BLD-MCNC: 13.2 G/DL (ref 13.5–17.5)
IMM GRANULOCYTES # BLD AUTO: 0.03 X10*3/UL (ref 0–0.7)
IMM GRANULOCYTES NFR BLD AUTO: 0.5 % (ref 0–0.9)
KETONES UR STRIP.AUTO-MCNC: NEGATIVE MG/DL
LDLC SERPL CALC-MCNC: 83 MG/DL
LEUKOCYTE ESTERASE UR QL STRIP.AUTO: NEGATIVE
LYMPHOCYTES # BLD AUTO: 2.64 X10*3/UL (ref 1.2–4.8)
LYMPHOCYTES NFR BLD AUTO: 41.8 %
MCH RBC QN AUTO: 32.5 PG (ref 26–34)
MCHC RBC AUTO-ENTMCNC: 32.4 G/DL (ref 32–36)
MCV RBC AUTO: 100 FL (ref 80–100)
MICROALBUMIN UR-MCNC: 8.3 MG/L
MICROALBUMIN/CREAT UR: 9.1 UG/MG CREAT
MONOCYTES # BLD AUTO: 0.54 X10*3/UL (ref 0.1–1)
MONOCYTES NFR BLD AUTO: 8.6 %
NEUTROPHILS # BLD AUTO: 2.95 X10*3/UL (ref 1.2–7.7)
NEUTROPHILS NFR BLD AUTO: 46.8 %
NITRITE UR QL STRIP.AUTO: NEGATIVE
NON HDL CHOLESTEROL: 111 MG/DL (ref 0–149)
NRBC BLD-RTO: 0 /100 WBCS (ref 0–0)
PH UR STRIP.AUTO: 5.5 [PH]
PLATELET # BLD AUTO: 179 X10*3/UL (ref 150–450)
POTASSIUM SERPL-SCNC: 4.2 MMOL/L (ref 3.5–5.3)
PROT SERPL-MCNC: 6.9 G/DL (ref 6.4–8.2)
PROT UR STRIP.AUTO-MCNC: NEGATIVE MG/DL
RBC # BLD AUTO: 4.06 X10*6/UL (ref 4.5–5.9)
RBC # UR STRIP.AUTO: NEGATIVE /UL
SODIUM SERPL-SCNC: 135 MMOL/L (ref 136–145)
SP GR UR STRIP.AUTO: 1.01
TRIGL SERPL-MCNC: 142 MG/DL (ref 0–149)
TSH SERPL-ACNC: 3.51 MIU/L (ref 0.44–3.98)
UROBILINOGEN UR STRIP.AUTO-MCNC: NORMAL MG/DL
VLDL: 28 MG/DL (ref 0–40)
WBC # BLD AUTO: 6.3 X10*3/UL (ref 4.4–11.3)

## 2024-07-26 PROCEDURE — 85025 COMPLETE CBC W/AUTO DIFF WBC: CPT

## 2024-07-26 PROCEDURE — 82570 ASSAY OF URINE CREATININE: CPT

## 2024-07-26 PROCEDURE — 82043 UR ALBUMIN QUANTITATIVE: CPT

## 2024-07-26 PROCEDURE — 36415 COLL VENOUS BLD VENIPUNCTURE: CPT

## 2024-07-26 PROCEDURE — 80061 LIPID PANEL: CPT

## 2024-07-26 PROCEDURE — 82306 VITAMIN D 25 HYDROXY: CPT

## 2024-07-26 PROCEDURE — 84154 ASSAY OF PSA FREE: CPT

## 2024-07-26 PROCEDURE — 80053 COMPREHEN METABOLIC PANEL: CPT

## 2024-07-26 PROCEDURE — 84443 ASSAY THYROID STIM HORMONE: CPT

## 2024-07-26 PROCEDURE — 83036 HEMOGLOBIN GLYCOSYLATED A1C: CPT

## 2024-07-26 PROCEDURE — 81003 URINALYSIS AUTO W/O SCOPE: CPT

## 2024-07-26 PROCEDURE — 84153 ASSAY OF PSA TOTAL: CPT

## 2024-07-27 LAB
EST. AVERAGE GLUCOSE BLD GHB EST-MCNC: 154 MG/DL
HBA1C MFR BLD: 7 %

## 2024-07-29 DIAGNOSIS — E55.9 VITAMIN D DEFICIENCY: Primary | ICD-10-CM

## 2024-07-29 LAB
PSA FREE MFR SERPL: 36 %
PSA FREE SERPL-MCNC: 0.4 NG/ML
PSA SERPL IA-MCNC: 1.1 NG/ML (ref 0–4)

## 2024-07-29 RX ORDER — ERGOCALCIFEROL 1.25 MG/1
50000 CAPSULE ORAL
Qty: 4 CAPSULE | Refills: 1 | Status: SHIPPED | OUTPATIENT
Start: 2024-08-04 | End: 2024-09-29

## 2024-09-13 ENCOUNTER — APPOINTMENT (OUTPATIENT)
Dept: PRIMARY CARE | Facility: CLINIC | Age: 69
End: 2024-09-13
Payer: MEDICARE

## 2024-09-27 ENCOUNTER — APPOINTMENT (OUTPATIENT)
Dept: PRIMARY CARE | Facility: CLINIC | Age: 69
End: 2024-09-27
Payer: MEDICARE

## 2024-09-27 VITALS
WEIGHT: 275 LBS | TEMPERATURE: 97.3 F | HEART RATE: 80 BPM | DIASTOLIC BLOOD PRESSURE: 76 MMHG | RESPIRATION RATE: 16 BRPM | SYSTOLIC BLOOD PRESSURE: 128 MMHG | BODY MASS INDEX: 41.81 KG/M2

## 2024-09-27 DIAGNOSIS — M79.641 HAND PAIN, RIGHT: ICD-10-CM

## 2024-09-27 DIAGNOSIS — E03.9 HYPOTHYROIDISM, UNSPECIFIED TYPE: ICD-10-CM

## 2024-09-27 DIAGNOSIS — E11.65 TYPE 2 DIABETES MELLITUS WITH HYPERGLYCEMIA, WITHOUT LONG-TERM CURRENT USE OF INSULIN: Primary | ICD-10-CM

## 2024-09-27 LAB
POC FINGERSTICK BLOOD GLUCOSE: 204 MG/DL (ref 70–100)
POC HEMOGLOBIN A1C: 6.5 % (ref 4.2–6.5)

## 2024-09-27 PROCEDURE — 3078F DIAST BP <80 MM HG: CPT | Performed by: NURSE PRACTITIONER

## 2024-09-27 PROCEDURE — 1160F RVW MEDS BY RX/DR IN RCRD: CPT | Performed by: NURSE PRACTITIONER

## 2024-09-27 PROCEDURE — 3048F LDL-C <100 MG/DL: CPT | Performed by: NURSE PRACTITIONER

## 2024-09-27 PROCEDURE — 99213 OFFICE O/P EST LOW 20 MIN: CPT | Performed by: NURSE PRACTITIONER

## 2024-09-27 PROCEDURE — 3061F NEG MICROALBUMINURIA REV: CPT | Performed by: NURSE PRACTITIONER

## 2024-09-27 PROCEDURE — 1123F ACP DISCUSS/DSCN MKR DOCD: CPT | Performed by: NURSE PRACTITIONER

## 2024-09-27 PROCEDURE — 3051F HG A1C>EQUAL 7.0%<8.0%: CPT | Performed by: NURSE PRACTITIONER

## 2024-09-27 PROCEDURE — 4010F ACE/ARB THERAPY RXD/TAKEN: CPT | Performed by: NURSE PRACTITIONER

## 2024-09-27 PROCEDURE — 3074F SYST BP LT 130 MM HG: CPT | Performed by: NURSE PRACTITIONER

## 2024-09-27 PROCEDURE — 83036 HEMOGLOBIN GLYCOSYLATED A1C: CPT | Performed by: NURSE PRACTITIONER

## 2024-09-27 PROCEDURE — 1159F MED LIST DOCD IN RCRD: CPT | Performed by: NURSE PRACTITIONER

## 2024-09-27 PROCEDURE — 82962 GLUCOSE BLOOD TEST: CPT | Performed by: NURSE PRACTITIONER

## 2024-09-27 PROCEDURE — 1036F TOBACCO NON-USER: CPT | Performed by: NURSE PRACTITIONER

## 2024-09-27 RX ORDER — GLIMEPIRIDE 4 MG/1
4 TABLET ORAL 2 TIMES DAILY
Qty: 180 TABLET | Refills: 2 | Status: SHIPPED | OUTPATIENT
Start: 2024-09-27

## 2024-09-27 RX ORDER — HYDROCHLOROTHIAZIDE 25 MG/1
25 TABLET ORAL DAILY
Qty: 90 TABLET | Refills: 2 | Status: SHIPPED | OUTPATIENT
Start: 2024-09-27

## 2024-09-27 RX ORDER — ERGOCALCIFEROL 1.25 MG/1
50000 CAPSULE ORAL
Qty: 12 CAPSULE | Refills: 2 | Status: SHIPPED | OUTPATIENT
Start: 2024-09-27

## 2024-09-27 RX ORDER — PIOGLITAZONEHYDROCHLORIDE 30 MG/1
30 TABLET ORAL NIGHTLY
Qty: 90 TABLET | Refills: 2 | Status: SHIPPED | OUTPATIENT
Start: 2024-09-27

## 2024-09-27 RX ORDER — LISINOPRIL 40 MG/1
40 TABLET ORAL DAILY
Qty: 90 TABLET | Refills: 2 | Status: SHIPPED | OUTPATIENT
Start: 2024-09-27

## 2024-09-27 RX ORDER — LEVOTHYROXINE SODIUM 137 UG/1
TABLET ORAL
Qty: 90 TABLET | Refills: 2 | Status: SHIPPED | OUTPATIENT
Start: 2024-09-27

## 2024-09-27 ASSESSMENT — ENCOUNTER SYMPTOMS
PSYCHIATRIC NEGATIVE: 1
CARDIOVASCULAR NEGATIVE: 1
CONSTITUTIONAL NEGATIVE: 1
RESPIRATORY NEGATIVE: 1
GASTROINTESTINAL NEGATIVE: 1
NEUROLOGICAL NEGATIVE: 1
ENDOCRINE NEGATIVE: 1
MUSCULOSKELETAL NEGATIVE: 1

## 2024-09-27 NOTE — PROGRESS NOTES
Right  Jalil Camp is a 69 y.o. here for a diabetic follow up exam.     Pt states they are doing well. Following a low carbohydrate diet and is active.     Up to date with eye and foot exams, pcp visits.     Aic 6.5%   Eating smaller portions   Eye care just went  With dr kate     Meds:  Actos 30mg po qd  Gleimpiride 4mg po bid     Also had a fall. Nonsycope/tripped. Fell open handed caught himself but now some pain right pinky and ring finger.       Lab Results   Component Value Date    POCGLU 204 (A) 09/27/2024    POCGLU 197 (A) 06/07/2024    POCGLU 158 (A) 03/01/2024    POCGLU 125 (A) 12/01/2023    HGBA1C 6.5 09/27/2024    HGBA1C 7.0 (H) 07/26/2024    HGBA1C 6.9 (A) 06/07/2024    HGBA1C 6.9 (A) 03/01/2024    HGBA1C 6.6 (A) 12/01/2023     /76 (BP Location: Left arm, Patient Position: Sitting, BP Cuff Size: Large adult)   Pulse 80   Temp 36.3 °C (97.3 °F) (Temporal)   Resp 16   Wt 125 kg (275 lb)   BMI 41.81 kg/m²    Wt Readings from Last 5 Encounters:   09/27/24 125 kg (275 lb)   07/22/24 125 kg (275 lb 9.6 oz)   06/07/24 127 kg (279 lb)   05/17/24 126 kg (278 lb 12.8 oz)   03/01/24 127 kg (281 lb)          Review of Systems   Constitutional: Negative.         Right hand pain. Not warm, mild swelling at right pinky and right ring finger. Full rom.  - he will ice/elevate and xray.    HENT: Negative.     Respiratory: Negative.     Cardiovascular: Negative.    Gastrointestinal: Negative.    Endocrine: Negative.    Genitourinary: Negative.    Musculoskeletal: Negative.    Skin: Negative.    Neurological: Negative.    Psychiatric/Behavioral: Negative.          Physical Exam  Vitals and nursing note reviewed.   Constitutional:       Appearance: Normal appearance.   HENT:      Head: Normocephalic.   Eyes:      Pupils: Pupils are equal, round, and reactive to light.   Cardiovascular:      Rate and Rhythm: Normal rate and regular rhythm.      Heart sounds: Normal heart sounds.   Pulmonary:      Effort:  Pulmonary effort is normal.      Breath sounds: Normal breath sounds.   Skin:     General: Skin is warm and dry.   Neurological:      General: No focal deficit present.      Mental Status: He is alert and oriented to person, place, and time. Mental status is at baseline.   Psychiatric:         Mood and Affect: Mood normal.         Behavior: Behavior normal.         Thought Content: Thought content normal.         Judgment: Judgment normal.        Problem List Items Addressed This Visit             ICD-10-CM    Hypothyroidism E03.9    Relevant Medications    ergocalciferol (Vitamin D-2) 1.25 MG (51735 UT) capsule    levothyroxine (Synthroid, Levoxyl) 137 mcg tablet    Type 2 diabetes mellitus (Multi) - Primary E11.9    Relevant Medications    ergocalciferol (Vitamin D-2) 1.25 MG (84947 UT) capsule    pioglitazone (Actos) 30 mg tablet    glimepiride (Amaryl) 4 mg tablet    lisinopril 40 mg tablet    hydroCHLOROthiazide (HYDRODiuril) 25 mg tablet    Other Relevant Orders    POCT glycosylated hemoglobin (Hb A1C) manually resulted (Completed)    POCT fingerstick glucose manually resulted (Completed)     Other Visit Diagnoses         Codes    Hand pain, right     M79.641    Relevant Orders    XR hand right 1-2 views         Laura L Seaver, APRN-CNP

## 2024-10-03 ENCOUNTER — TELEPHONE (OUTPATIENT)
Dept: PRIMARY CARE | Facility: CLINIC | Age: 69
End: 2024-10-03
Payer: MEDICARE

## 2024-10-03 NOTE — TELEPHONE ENCOUNTER
Pls inform negative. If pain continues we need to start PT, let us know. Ice/elevate. Motrin for the inflammation. lsnp

## 2024-10-25 ENCOUNTER — APPOINTMENT (OUTPATIENT)
Dept: PODIATRY | Facility: CLINIC | Age: 69
End: 2024-10-25
Payer: MEDICARE

## 2024-10-25 DIAGNOSIS — M76.61 TENDONITIS, ACHILLES, RIGHT: Primary | ICD-10-CM

## 2024-10-25 DIAGNOSIS — M79.674 TOE PAIN, RIGHT: ICD-10-CM

## 2024-10-25 DIAGNOSIS — E11.9 DIABETES MELLITUS WITHOUT COMPLICATION (MULTI): ICD-10-CM

## 2024-10-25 DIAGNOSIS — M79.675 TOE PAIN, LEFT: ICD-10-CM

## 2024-10-25 DIAGNOSIS — B35.1 TINEA UNGUIUM: ICD-10-CM

## 2024-10-25 PROCEDURE — 1123F ACP DISCUSS/DSCN MKR DOCD: CPT | Performed by: PODIATRIST

## 2024-10-25 PROCEDURE — 11721 DEBRIDE NAIL 6 OR MORE: CPT | Performed by: PODIATRIST

## 2024-10-25 PROCEDURE — 1160F RVW MEDS BY RX/DR IN RCRD: CPT | Performed by: PODIATRIST

## 2024-10-25 PROCEDURE — 1159F MED LIST DOCD IN RCRD: CPT | Performed by: PODIATRIST

## 2024-10-25 PROCEDURE — 3048F LDL-C <100 MG/DL: CPT | Performed by: PODIATRIST

## 2024-10-25 PROCEDURE — 99212 OFFICE O/P EST SF 10 MIN: CPT | Performed by: PODIATRIST

## 2024-10-25 PROCEDURE — 3051F HG A1C>EQUAL 7.0%<8.0%: CPT | Performed by: PODIATRIST

## 2024-10-25 PROCEDURE — 4010F ACE/ARB THERAPY RXD/TAKEN: CPT | Performed by: PODIATRIST

## 2024-10-25 PROCEDURE — 3061F NEG MICROALBUMINURIA REV: CPT | Performed by: PODIATRIST

## 2024-10-25 PROCEDURE — 1036F TOBACCO NON-USER: CPT | Performed by: PODIATRIST

## 2024-10-25 NOTE — PROGRESS NOTES
CC:  painful thickened and elongated toenails and diabetic care.   Right achilles pain     HPI: Pt presents for dm foot exam and painful thickened and elongated toenails that are difficult to manage.  Onset was gradual with worsening course until recently.  Aggravated by shoe gear and ambulation. Also has pain right achilles tendon, did price and heel lift, still painful.        PCP: Dr. Bowen  Last visit: 9-27-24      PMH  Medical History        Past Medical History:   Diagnosis Date    Anal fissure 04/03/2017    Cellulitis of right lower limb 09/14/2022     Cellulitis of right leg    COVID-19 virus infection 09/06/2022    Diabetes mellitus (Multi)      Diarrhea 09/06/2022    Fatty liver      High cholesterol      Hypertension      Nicotine use disorder 08/22/2022    Onychomycosis of toenail 07/22/2024    Small intestine cancer (Multi)           MEDS    Current Medications      Current Outpatient Medications:     blood sugar diagnostic (Accu-Chek Nai Plus test strp) strip, , Disp: , Rfl:     ergocalciferol (Vitamin D-2) 1.25 MG (41379 UT) capsule, Take 1 capsule (50,000 Units) by mouth every 30 (thirty) days. (Patient not taking: Reported on 7/22/2024), Disp: 12 capsule, Rfl: 2    glimepiride (Amaryl) 4 mg tablet, Take 1 tablet (4 mg) by mouth 2 times a day., Disp: 180 tablet, Rfl: 2    hydroCHLOROthiazide (HYDRODiuril) 25 mg tablet, Take 1 tablet (25 mg) by mouth once daily., Disp: 90 tablet, Rfl: 2    levothyroxine (Synthroid, Levoxyl) 137 mcg tablet, TAKE 1 TABLET BY MOUTH EVERY MORNING. On sundays only TAKE 1/2 (ONE-HALF) OF A TABLET. No food or drink for one hour after taking., Disp: 90 tablet, Rfl: 2    lisinopril 40 mg tablet, Take 1 tablet (40 mg) by mouth once daily., Disp: 90 tablet, Rfl: 2    pioglitazone (Actos) 30 mg tablet, Take 1 tablet (30 mg) by mouth once daily at bedtime., Disp: 90 tablet, Rfl: 2     Allergies  Allergies         Allergies   Allergen Reactions    Penicillins Unknown and Other        Syncope     Causes patient to pass out.     fainting    Metformin Unknown       diarr lsnp         Social History   Social History           Socioeconomic History    Marital status:    Tobacco Use    Smoking status: Never    Smokeless tobacco: Never   Substance and Sexual Activity    Alcohol use: Not Currently    Drug use: Never    Sexual activity: Yes         Family History          Family History   Problem Relation Name Age of Onset    Diabetes Mother        Cancer Brother        Heart disease Brother        Heart disease Maternal Grandfather             Surgical History         Past Surgical History:   Procedure Laterality Date    OTHER SURGICAL HISTORY   10/10/2020     Gallbladder surgery    OTHER SURGICAL HISTORY   09/14/2022     Tonsillectomy    SMALL INTESTINE SURGERY                REVIEW OF SYSTEMS  DERM:   + as noted in HPI.         Physical examination:   On General Observation: Patient is a pleasant, cooperative, well developed 69 y.o. diabetic   adult male. The patient is alert and oriented to time, place and person. Patient has normal affect and mood.  There were no vitals taken for this visit.     Vascular:  DP and PT pulses are 2/4 b/l. mild edema noted. mild varicosities b/l.  CFT  5 seconds to all digits bilateral.  Skin temperature is warm to warm from proximal to distal bilateral.       Muscular: Strength is 5/5 for all instrinsic and extrinsic muscle groups.      Neuro:  Proprioception present.   Sensation to vibration is  present. Protective sensation intact  at all pedal sites via [place] Blanco 5.07 monofilament bilateral.  Light touch present bilateral.      Derm:    Left toenails: 1-5 Brittleness, crumbling upon debridement, subungual debris, elongation, mycotic appearance, tenderness, and thickness.   Right toenails: 1-5 Brittleness, crumbling upon debridement, subungual debris, elongation, mycotic appearance, tenderness, and thickness.   Hair growth is decreased b/l le      Ortho:  Pain is present posterior heel insertion of the achiiles tendon, decreased in AJ df is present with the knee flexed and extnded.     ASSESSMENT:    Achiiles tendonitis right   Pain foot/ankle  Tinea Unguium [B35.1]   DM  Pain in right toe(s) [M79.674]   Pain in left toe(s) [M79.675]         PLAN:   Exam  Reviewed etiology of achilles tendonitis as well as treatment plan, will start price and hep, handout given, he will not wear cam walker, will obtain xrays, continue the heel lift, will rx for therapy.  A comprehensive history and physical examination were preformed. DM foot care and DM foot manifestations were reviewed.  The patient was educated on clinical findings, diagnosis and treatment plans. Patient understands all that has been explained and all questions were answered to apparent satisfaction.      - Debrided toenails 1-10 in length and height.   - Follow up in 9-12 weeks.         Cooper Shoemaker DPM

## 2024-11-04 ENCOUNTER — APPOINTMENT (OUTPATIENT)
Dept: PHYSICAL THERAPY | Facility: CLINIC | Age: 69
End: 2024-11-04
Payer: MEDICARE

## 2024-11-04 DIAGNOSIS — M76.61 TENDONITIS, ACHILLES, RIGHT: Primary | ICD-10-CM

## 2024-11-04 PROCEDURE — 97162 PT EVAL MOD COMPLEX 30 MIN: CPT | Performed by: PHYSICAL THERAPIST

## 2024-11-04 NOTE — PROGRESS NOTES
Physical Therapy     Physical Therapy Evaluation and Treatment      Patient Name: Jalil Camp 1955   Encounter Diagnosis   Name Primary?    Tendonitis, Achilles, right Yes        THERAPY VISIT NUMBER:    1    Today's Date: 11-4-24    REFERRING DR. WOODS     SUBJECTIVE:     PAINFUL RIGHT ACHILLES---DEBILITY WITH WALKING --LIMPING AT THIS DATE--ALSO---UNABLE TO CUT GRASS       GOALS:   ABLE TO WALK AND WORK WITH LESS ACHILLES PAIN     OBJECTIVE:  LIMPING--DIFFICULTY PUSHING OFF---6/10 ACHILLES PAIN--LOWER EXTREMITY FUNCTIONAL INDEX---22/80     ASSESSMENT: WALKING DEBILITY---DUE TO ACHILLES PAIN    PLAN AND TREATMENT:  SEE FLOW SHEET PROGRAM IN CHART:    1 X WEEKLY--CALL INSURANCE --TO GET AUTHORIZATION

## 2024-11-11 ENCOUNTER — APPOINTMENT (OUTPATIENT)
Dept: PHYSICAL THERAPY | Facility: CLINIC | Age: 69
End: 2024-11-11
Payer: MEDICARE

## 2024-11-11 DIAGNOSIS — M76.61 TENDONITIS, ACHILLES, RIGHT: Primary | ICD-10-CM

## 2024-11-11 PROCEDURE — 97035 APP MDLTY 1+ULTRASOUND EA 15: CPT | Performed by: PHYSICAL THERAPIST

## 2024-11-11 PROCEDURE — 97140 MANUAL THERAPY 1/> REGIONS: CPT | Performed by: PHYSICAL THERAPIST

## 2024-11-11 NOTE — PROGRESS NOTES
Physical Therapy     Physical Therapy Evaluation and Treatment      Patient Name:  Physical Therapy     Physical Therapy Evaluation and Treatment      Patient Name:Jalil Camp 1955   Encounter Diagnosis   Name Primary?    Tendonitis, Achilles, right Yes        THERAPY VISIT NUMBER:   2    Today's Date: 11-11-24    REFERRING DR. WOODS     SUBJECTIVE:       PAINFUL RIGHT ACHILLES--IS DOING A LITTLE BETTER     GOALS:   SEE FLOW SHEET     OBJECTIVE: WALKING BETTER---4 MORE VISITS     ASSESSMENT:  DEBILITY    PLAN AND TREATMENT:  SEE FLOW SHEET PROGRAM IN CHART:    1 X WEEKLY

## 2024-11-18 ENCOUNTER — APPOINTMENT (OUTPATIENT)
Dept: PHYSICAL THERAPY | Facility: CLINIC | Age: 69
End: 2024-11-18
Payer: MEDICARE

## 2024-11-18 DIAGNOSIS — M76.61 TENDONITIS, ACHILLES, RIGHT: Primary | ICD-10-CM

## 2024-11-18 PROCEDURE — 97140 MANUAL THERAPY 1/> REGIONS: CPT | Performed by: PHYSICAL THERAPIST

## 2024-11-18 PROCEDURE — 97035 APP MDLTY 1+ULTRASOUND EA 15: CPT | Performed by: PHYSICAL THERAPIST

## 2024-11-22 ENCOUNTER — APPOINTMENT (OUTPATIENT)
Dept: PODIATRY | Facility: CLINIC | Age: 69
End: 2024-11-22
Payer: MEDICARE

## 2024-11-22 DIAGNOSIS — M76.61 TENDONITIS, ACHILLES, RIGHT: Primary | ICD-10-CM

## 2024-11-22 DIAGNOSIS — M25.571 PAIN IN RIGHT ANKLE AND JOINTS OF RIGHT FOOT: ICD-10-CM

## 2024-11-22 PROCEDURE — 3048F LDL-C <100 MG/DL: CPT | Performed by: PODIATRIST

## 2024-11-22 PROCEDURE — 99212 OFFICE O/P EST SF 10 MIN: CPT | Performed by: PODIATRIST

## 2024-11-22 PROCEDURE — 1159F MED LIST DOCD IN RCRD: CPT | Performed by: PODIATRIST

## 2024-11-22 PROCEDURE — 3051F HG A1C>EQUAL 7.0%<8.0%: CPT | Performed by: PODIATRIST

## 2024-11-22 PROCEDURE — 1036F TOBACCO NON-USER: CPT | Performed by: PODIATRIST

## 2024-11-22 PROCEDURE — 3061F NEG MICROALBUMINURIA REV: CPT | Performed by: PODIATRIST

## 2024-11-22 PROCEDURE — L4361 PNEUMA/VAC WALK BOOT PRE OTS: HCPCS | Performed by: PODIATRIST

## 2024-11-22 PROCEDURE — 1123F ACP DISCUSS/DSCN MKR DOCD: CPT | Performed by: PODIATRIST

## 2024-11-22 PROCEDURE — 4010F ACE/ARB THERAPY RXD/TAKEN: CPT | Performed by: PODIATRIST

## 2024-11-22 PROCEDURE — 1160F RVW MEDS BY RX/DR IN RCRD: CPT | Performed by: PODIATRIST

## 2024-11-22 NOTE — PROGRESS NOTES
CC:  follow up right achilles pain     HPI: Pt presents follow up right achilles tendonitis, has had 3 sessions of therapy, xrays and wearing heel lifts, no significant changes.        PCP: Dr. Bowen  Last visit: 9-27-24      PMH  Medical History           Past Medical History:   Diagnosis Date    Anal fissure 04/03/2017    Cellulitis of right lower limb 09/14/2022     Cellulitis of right leg    COVID-19 virus infection 09/06/2022    Diabetes mellitus (Multi)      Diarrhea 09/06/2022    Fatty liver      High cholesterol      Hypertension      Nicotine use disorder 08/22/2022    Onychomycosis of toenail 07/22/2024    Small intestine cancer (Multi)           MEDS     Current Medications      Current Outpatient Medications:     blood sugar diagnostic (Accu-Chek Nai Plus test strp) strip, , Disp: , Rfl:     ergocalciferol (Vitamin D-2) 1.25 MG (28931 UT) capsule, Take 1 capsule (50,000 Units) by mouth every 30 (thirty) days. (Patient not taking: Reported on 7/22/2024), Disp: 12 capsule, Rfl: 2    glimepiride (Amaryl) 4 mg tablet, Take 1 tablet (4 mg) by mouth 2 times a day., Disp: 180 tablet, Rfl: 2    hydroCHLOROthiazide (HYDRODiuril) 25 mg tablet, Take 1 tablet (25 mg) by mouth once daily., Disp: 90 tablet, Rfl: 2    levothyroxine (Synthroid, Levoxyl) 137 mcg tablet, TAKE 1 TABLET BY MOUTH EVERY MORNING. On sundays only TAKE 1/2 (ONE-HALF) OF A TABLET. No food or drink for one hour after taking., Disp: 90 tablet, Rfl: 2    lisinopril 40 mg tablet, Take 1 tablet (40 mg) by mouth once daily., Disp: 90 tablet, Rfl: 2    pioglitazone (Actos) 30 mg tablet, Take 1 tablet (30 mg) by mouth once daily at bedtime., Disp: 90 tablet, Rfl: 2      Allergies  Allergies             Allergies   Allergen Reactions    Penicillins Unknown and Other       Syncope     Causes patient to pass out.     fainting    Metformin Unknown       diarr lsnp         Social History   Social History              Socioeconomic History    Marital  status:    Tobacco Use    Smoking status: Never    Smokeless tobacco: Never   Substance and Sexual Activity    Alcohol use: Not Currently    Drug use: Never    Sexual activity: Yes         Family History               Family History   Problem Relation Name Age of Onset    Diabetes Mother        Cancer Brother        Heart disease Brother        Heart disease Maternal Grandfather             Surgical History             Past Surgical History:   Procedure Laterality Date    OTHER SURGICAL HISTORY   10/10/2020     Gallbladder surgery    OTHER SURGICAL HISTORY   09/14/2022     Tonsillectomy    SMALL INTESTINE SURGERY                REVIEW OF SYSTEMS  DERM:   + as noted in HPI.         Physical examination:   On General Observation: Patient is a pleasant, cooperative, well developed 69 y.o. diabetic   adult male. The patient is alert and oriented to time, place and person. Patient has normal affect and mood.  There were no vitals taken for this visit.     Vascular:  DP and PT pulses are 2/4 b/l. mild edema noted. mild varicosities b/l.  CFT  5 seconds to all digits bilateral.  Skin temperature is warm to warm from proximal to distal bilateral.       Muscular: Strength is 5/5 for all instrinsic and extrinsic muscle groups.      Neuro:  Proprioception present.   Sensation to vibration is  present. Protective sensation intact  at all pedal sites via [place] Blanco 5.07 monofilament bilateral.  Light touch present bilateral.      Derm:    Left toenails: 1-5 Brittleness, crumbling upon debridement, subungual debris, elongation, mycotic appearance, tenderness, and thickness.   Right toenails: 1-5 Brittleness, crumbling upon debridement, subungual debris, elongation, mycotic appearance, tenderness, and thickness.   Hair growth is decreased b/l le     Ortho:  Pain is present posterior heel insertion of the achiiles tendon, decreased in AJ df is present with the knee flexed and extended.     ASSESSMENT:    Jose Angel  tendonitis right   Pain foot/ankle       PLAN:   Exam  Reviewed etiology of achilles tendonitis as well as treatment plan, will start medellin and hep, will continue the therapy, dispensed angella profile pneumatic cam walker fit to the pt, will follow up 2 weeks if not better then mri.        Cooper Shoemaker DPM

## 2024-11-25 ENCOUNTER — APPOINTMENT (OUTPATIENT)
Dept: PHYSICAL THERAPY | Facility: CLINIC | Age: 69
End: 2024-11-25
Payer: MEDICARE

## 2024-12-02 ENCOUNTER — APPOINTMENT (OUTPATIENT)
Dept: PHYSICAL THERAPY | Facility: CLINIC | Age: 69
End: 2024-12-02
Payer: MEDICARE

## 2024-12-05 ENCOUNTER — APPOINTMENT (OUTPATIENT)
Dept: PHYSICAL THERAPY | Facility: CLINIC | Age: 69
End: 2024-12-05
Payer: MEDICARE

## 2024-12-05 DIAGNOSIS — M76.61 TENDONITIS, ACHILLES, RIGHT: Primary | ICD-10-CM

## 2024-12-05 PROCEDURE — 97140 MANUAL THERAPY 1/> REGIONS: CPT | Performed by: PHYSICAL THERAPIST

## 2024-12-05 PROCEDURE — 97035 APP MDLTY 1+ULTRASOUND EA 15: CPT | Performed by: PHYSICAL THERAPIST

## 2024-12-05 NOTE — PROGRESS NOTES
Physical Therapy     Physical Therapy Evaluation and Treatment      Patient Name:Jalil Camp 1955   Encounter Diagnosis   Name Primary?    Tendonitis, Achilles, right Yes        THERAPY VISIT NUMBER:  4    Today's Date:  12-5-24    REFERRING DR. WOODS     SUBJECTIVE:        PAINFUL ACHILLES---MAKING PROGRESS     GOALS:  PLAN 2 MORE VISITS     OBJECTIVE:   LIMPING YET--5/10 ACHILLES PAIN     ASSESSMENT:  DEBILITY    PLAN AND TREATMENT:  SEE FLOW SHEET PROGRAM IN CHART:    1 X WEEKLY

## 2024-12-06 ENCOUNTER — APPOINTMENT (OUTPATIENT)
Dept: PODIATRY | Facility: CLINIC | Age: 69
End: 2024-12-06
Payer: MEDICARE

## 2024-12-06 DIAGNOSIS — M25.571 PAIN IN RIGHT ANKLE AND JOINTS OF RIGHT FOOT: ICD-10-CM

## 2024-12-06 DIAGNOSIS — M76.61 TENDONITIS, ACHILLES, RIGHT: Primary | ICD-10-CM

## 2024-12-06 PROCEDURE — 99212 OFFICE O/P EST SF 10 MIN: CPT | Performed by: PODIATRIST

## 2024-12-06 PROCEDURE — 1036F TOBACCO NON-USER: CPT | Performed by: PODIATRIST

## 2024-12-06 PROCEDURE — 3061F NEG MICROALBUMINURIA REV: CPT | Performed by: PODIATRIST

## 2024-12-06 PROCEDURE — 1160F RVW MEDS BY RX/DR IN RCRD: CPT | Performed by: PODIATRIST

## 2024-12-06 PROCEDURE — 3048F LDL-C <100 MG/DL: CPT | Performed by: PODIATRIST

## 2024-12-06 PROCEDURE — 4010F ACE/ARB THERAPY RXD/TAKEN: CPT | Performed by: PODIATRIST

## 2024-12-06 PROCEDURE — 1159F MED LIST DOCD IN RCRD: CPT | Performed by: PODIATRIST

## 2024-12-06 PROCEDURE — 1123F ACP DISCUSS/DSCN MKR DOCD: CPT | Performed by: PODIATRIST

## 2024-12-06 PROCEDURE — 3051F HG A1C>EQUAL 7.0%<8.0%: CPT | Performed by: PODIATRIST

## 2024-12-09 NOTE — PROGRESS NOTES
CC:  follow up right achilles pain     HPI: Pt presents follow up right achilles tendonitis, has had 3sessions of therapy, xrays and wearing heel lifts, slight improvement.        PCP: Dr. Bowen  Last visit: 9-27-24      PMH  Medical History           Past Medical History:   Diagnosis Date    Anal fissure 04/03/2017    Cellulitis of right lower limb 09/14/2022     Cellulitis of right leg    COVID-19 virus infection 09/06/2022    Diabetes mellitus (Multi)      Diarrhea 09/06/2022    Fatty liver      High cholesterol      Hypertension      Nicotine use disorder 08/22/2022    Onychomycosis of toenail 07/22/2024    Small intestine cancer (Multi)           MEDS     Current Medications      Current Outpatient Medications:     blood sugar diagnostic (Accu-Chek Nai Plus test strp) strip, , Disp: , Rfl:     ergocalciferol (Vitamin D-2) 1.25 MG (50487 UT) capsule, Take 1 capsule (50,000 Units) by mouth every 30 (thirty) days. (Patient not taking: Reported on 7/22/2024), Disp: 12 capsule, Rfl: 2    glimepiride (Amaryl) 4 mg tablet, Take 1 tablet (4 mg) by mouth 2 times a day., Disp: 180 tablet, Rfl: 2    hydroCHLOROthiazide (HYDRODiuril) 25 mg tablet, Take 1 tablet (25 mg) by mouth once daily., Disp: 90 tablet, Rfl: 2    levothyroxine (Synthroid, Levoxyl) 137 mcg tablet, TAKE 1 TABLET BY MOUTH EVERY MORNING. On sundays only TAKE 1/2 (ONE-HALF) OF A TABLET. No food or drink for one hour after taking., Disp: 90 tablet, Rfl: 2    lisinopril 40 mg tablet, Take 1 tablet (40 mg) by mouth once daily., Disp: 90 tablet, Rfl: 2    pioglitazone (Actos) 30 mg tablet, Take 1 tablet (30 mg) by mouth once daily at bedtime., Disp: 90 tablet, Rfl: 2      Allergies  Allergies             Allergies   Allergen Reactions    Penicillins Unknown and Other       Syncope     Causes patient to pass out.     fainting    Metformin Unknown       diarr lsnp         Social History   Social History              Socioeconomic History    Marital status:     Tobacco Use    Smoking status: Never    Smokeless tobacco: Never   Substance and Sexual Activity    Alcohol use: Not Currently    Drug use: Never    Sexual activity: Yes         Family History               Family History   Problem Relation Name Age of Onset    Diabetes Mother        Cancer Brother        Heart disease Brother        Heart disease Maternal Grandfather             Surgical History             Past Surgical History:   Procedure Laterality Date    OTHER SURGICAL HISTORY   10/10/2020     Gallbladder surgery    OTHER SURGICAL HISTORY   09/14/2022     Tonsillectomy    SMALL INTESTINE SURGERY                REVIEW OF SYSTEMS  DERM:   + as noted in HPI.         Physical examination:   On General Observation: Patient is a pleasant, cooperative, well developed 69 y.o. diabetic   adult male. The patient is alert and oriented to time, place and person. Patient has normal affect and mood.  There were no vitals taken for this visit.     Vascular:  DP and PT pulses are 2/4 b/l. mild edema noted. mild varicosities b/l.  CFT  5 seconds to all digits bilateral.  Skin temperature is warm to warm from proximal to distal bilateral.       Muscular: Strength is 5/5 for all instrinsic and extrinsic muscle groups.      Neuro:  Proprioception present.   Sensation to vibration is  present. Protective sensation intact  at all pedal sites via [place] Blanco 5.07 monofilament bilateral.  Light touch present bilateral.      Derm:    Left toenails: 1-5 Brittleness, crumbling upon debridement, subungual debris, elongation, mycotic appearance, tenderness, and thickness.   Right toenails: 1-5 Brittleness, crumbling upon debridement, subungual debris, elongation, mycotic appearance, tenderness, and thickness.   Hair growth is decreased b/l le     Ortho:  Pain is present posterior heel insertion of the achiiles tendon, decreased in AJ df is present with the knee flexed and extended.     ASSESSMENT:    Achiiles tendonitis  right   Pain foot/ankle        PLAN:   Exam  Reviewed etiology of achilles tendonitis as well as treatment plan, will start medellin and hep, will finish the therapy, will continue pneumatic cam walker fit to the pt, will follow up 4 weeks if not better then mri.        Cooper Shoemaker DPM

## 2024-12-13 ENCOUNTER — APPOINTMENT (OUTPATIENT)
Dept: PHYSICAL THERAPY | Facility: CLINIC | Age: 69
End: 2024-12-13
Payer: MEDICARE

## 2024-12-13 DIAGNOSIS — M76.61 TENDONITIS, ACHILLES, RIGHT: Primary | ICD-10-CM

## 2024-12-13 PROCEDURE — 97140 MANUAL THERAPY 1/> REGIONS: CPT | Performed by: PHYSICAL THERAPIST

## 2024-12-13 PROCEDURE — 97035 APP MDLTY 1+ULTRASOUND EA 15: CPT | Performed by: PHYSICAL THERAPIST

## 2024-12-13 NOTE — PROGRESS NOTES
Physical Therapy      Physical Therapy Treatment      Patient Name: Jalil Camp     MRN:[id number]      Today's Date: 12/13/24      REFERRING DR albert      SUBJECTIVE:  pt states it is helping but slowly             GOALS:  painfree R foot heel          OBJECTIVE:  full tx see exercise flowsheet , added towel stretch            ASSESSMENT: added towel stretch and pt said it felt good, he will do towel stretch daily , has 1 more visit states PT has really helped           PLAN/TREATMENT/VISIT:    continue 1 more visit

## 2024-12-20 ENCOUNTER — APPOINTMENT (OUTPATIENT)
Dept: PHYSICAL THERAPY | Facility: CLINIC | Age: 69
End: 2024-12-20
Payer: MEDICARE

## 2025-01-03 ENCOUNTER — OFFICE VISIT (OUTPATIENT)
Dept: PODIATRY | Facility: CLINIC | Age: 70
End: 2025-01-03
Payer: MEDICARE

## 2025-01-03 ENCOUNTER — LAB (OUTPATIENT)
Dept: LAB | Facility: LAB | Age: 70
End: 2025-01-03
Payer: MEDICARE

## 2025-01-03 ENCOUNTER — APPOINTMENT (OUTPATIENT)
Dept: PRIMARY CARE | Facility: CLINIC | Age: 70
End: 2025-01-03
Payer: MEDICARE

## 2025-01-03 VITALS
RESPIRATION RATE: 16 BRPM | WEIGHT: 276 LBS | SYSTOLIC BLOOD PRESSURE: 140 MMHG | HEART RATE: 77 BPM | DIASTOLIC BLOOD PRESSURE: 82 MMHG | TEMPERATURE: 97.1 F | BODY MASS INDEX: 41.83 KG/M2 | HEIGHT: 68 IN

## 2025-01-03 DIAGNOSIS — E11.65 TYPE 2 DIABETES MELLITUS WITH HYPERGLYCEMIA, WITHOUT LONG-TERM CURRENT USE OF INSULIN: ICD-10-CM

## 2025-01-03 DIAGNOSIS — R79.89 ELEVATED SERUM CREATININE: ICD-10-CM

## 2025-01-03 DIAGNOSIS — Z00.00 ANNUAL PHYSICAL EXAM: Primary | ICD-10-CM

## 2025-01-03 DIAGNOSIS — Z00.00 ANNUAL PHYSICAL EXAM: ICD-10-CM

## 2025-01-03 DIAGNOSIS — D64.9 ANEMIA, UNSPECIFIED TYPE: ICD-10-CM

## 2025-01-03 DIAGNOSIS — M76.61 TENDONITIS, ACHILLES, RIGHT: Primary | ICD-10-CM

## 2025-01-03 DIAGNOSIS — E53.8 VITAMIN B12 DEFICIENCY: Primary | ICD-10-CM

## 2025-01-03 LAB
ALBUMIN SERPL BCP-MCNC: 4.2 G/DL (ref 3.4–5)
ALP SERPL-CCNC: 51 U/L (ref 33–136)
ALT SERPL W P-5'-P-CCNC: 11 U/L (ref 10–52)
ANION GAP SERPL CALC-SCNC: 12 MMOL/L (ref 10–20)
AST SERPL W P-5'-P-CCNC: 13 U/L (ref 9–39)
BASOPHILS # BLD AUTO: 0.04 X10*3/UL (ref 0–0.1)
BASOPHILS NFR BLD AUTO: 0.7 %
BILIRUB SERPL-MCNC: 1 MG/DL (ref 0–1.2)
BUN SERPL-MCNC: 24 MG/DL (ref 6–23)
CALCIUM SERPL-MCNC: 9.3 MG/DL (ref 8.6–10.3)
CHLORIDE SERPL-SCNC: 101 MMOL/L (ref 98–107)
CO2 SERPL-SCNC: 30 MMOL/L (ref 21–32)
CREAT SERPL-MCNC: 1.25 MG/DL (ref 0.5–1.3)
EGFRCR SERPLBLD CKD-EPI 2021: 62 ML/MIN/1.73M*2
EOSINOPHIL # BLD AUTO: 0.12 X10*3/UL (ref 0–0.7)
EOSINOPHIL NFR BLD AUTO: 2 %
ERYTHROCYTE [DISTWIDTH] IN BLOOD BY AUTOMATED COUNT: 15.9 % (ref 11.5–14.5)
GLUCOSE SERPL-MCNC: 142 MG/DL (ref 74–99)
HCT VFR BLD AUTO: 41.4 % (ref 41–52)
HGB BLD-MCNC: 13.2 G/DL (ref 13.5–17.5)
IMM GRANULOCYTES # BLD AUTO: 0.03 X10*3/UL (ref 0–0.7)
IMM GRANULOCYTES NFR BLD AUTO: 0.5 % (ref 0–0.9)
IRON SATN MFR SERPL: 31 % (ref 25–45)
IRON SERPL-MCNC: 95 UG/DL (ref 35–150)
LYMPHOCYTES # BLD AUTO: 2.26 X10*3/UL (ref 1.2–4.8)
LYMPHOCYTES NFR BLD AUTO: 38.3 %
MCH RBC QN AUTO: 32.8 PG (ref 26–34)
MCHC RBC AUTO-ENTMCNC: 31.9 G/DL (ref 32–36)
MCV RBC AUTO: 103 FL (ref 80–100)
MONOCYTES # BLD AUTO: 0.5 X10*3/UL (ref 0.1–1)
MONOCYTES NFR BLD AUTO: 8.5 %
NEUTROPHILS # BLD AUTO: 2.95 X10*3/UL (ref 1.2–7.7)
NEUTROPHILS NFR BLD AUTO: 50 %
NRBC BLD-RTO: 0.3 /100 WBCS (ref 0–0)
PLATELET # BLD AUTO: 175 X10*3/UL (ref 150–450)
POC FINGERSTICK BLOOD GLUCOSE: 173 MG/DL (ref 70–100)
POC HEMOGLOBIN A1C: 6.7 % (ref 4.2–6.5)
POTASSIUM SERPL-SCNC: 4.1 MMOL/L (ref 3.5–5.3)
PROT SERPL-MCNC: 7.1 G/DL (ref 6.4–8.2)
RBC # BLD AUTO: 4.03 X10*6/UL (ref 4.5–5.9)
SODIUM SERPL-SCNC: 139 MMOL/L (ref 136–145)
TIBC SERPL-MCNC: 309 UG/DL (ref 240–445)
TSH SERPL-ACNC: 0.79 MIU/L (ref 0.44–3.98)
UIBC SERPL-MCNC: 214 UG/DL (ref 110–370)
VIT B12 SERPL-MCNC: 144 PG/ML (ref 211–911)
WBC # BLD AUTO: 5.9 X10*3/UL (ref 4.4–11.3)

## 2025-01-03 PROCEDURE — 4010F ACE/ARB THERAPY RXD/TAKEN: CPT | Performed by: PODIATRIST

## 2025-01-03 PROCEDURE — 84443 ASSAY THYROID STIM HORMONE: CPT

## 2025-01-03 PROCEDURE — 1123F ACP DISCUSS/DSCN MKR DOCD: CPT | Performed by: PODIATRIST

## 2025-01-03 PROCEDURE — 1159F MED LIST DOCD IN RCRD: CPT | Performed by: PODIATRIST

## 2025-01-03 PROCEDURE — 83550 IRON BINDING TEST: CPT

## 2025-01-03 PROCEDURE — 82607 VITAMIN B-12: CPT

## 2025-01-03 PROCEDURE — 85025 COMPLETE CBC W/AUTO DIFF WBC: CPT

## 2025-01-03 PROCEDURE — 1036F TOBACCO NON-USER: CPT | Performed by: PODIATRIST

## 2025-01-03 PROCEDURE — 80053 COMPREHEN METABOLIC PANEL: CPT

## 2025-01-03 PROCEDURE — 83540 ASSAY OF IRON: CPT

## 2025-01-03 PROCEDURE — 99212 OFFICE O/P EST SF 10 MIN: CPT | Performed by: PODIATRIST

## 2025-01-03 PROCEDURE — 1160F RVW MEDS BY RX/DR IN RCRD: CPT | Performed by: PODIATRIST

## 2025-01-03 ASSESSMENT — ENCOUNTER SYMPTOMS
CONSTITUTIONAL NEGATIVE: 1
MUSCULOSKELETAL NEGATIVE: 1
PSYCHIATRIC NEGATIVE: 1
CARDIOVASCULAR NEGATIVE: 1
NEUROLOGICAL NEGATIVE: 1
ENDOCRINE NEGATIVE: 1
GASTROINTESTINAL NEGATIVE: 1
RESPIRATORY NEGATIVE: 1

## 2025-01-03 NOTE — PROGRESS NOTES
" Jalil Camp is a 69 y.o. here for a diabetic follow up exam.     Pt states they are doing well. Following a low carbohydrate diet and is active.     Up to date with eye and foot exams, pcp visits.     Last wt 275  Last aic 6.5% (7.0, 6.9, 6.9, 6.6)  - 6.7 today   Goal aic under 7  Dr urbina pcp 7/24 and has f/u with dr sinha     Meds:  Glimepiride 4mg po bid  Actos 30mg nightly   Stopped metformin - gi upset    Elevated creat 12/24 with [facility] provider see notes 1.7, prior 1.32  Will repeat this today with his tsh and if abn - renal referral - or ? Change in his hctz - he is already on lisinopril 40mg will f/u after labs done      Lab Results   Component Value Date    POCGLU 173 (A) 01/03/2025    POCGLU 204 (A) 09/27/2024    POCGLU 197 (A) 06/07/2024    POCGLU 158 (A) 03/01/2024    POCGLU 125 (A) 12/01/2023    HGBA1C 6.7 (A) 01/03/2025    HGBA1C 6.5 09/27/2024    HGBA1C 7.0 (H) 07/26/2024    HGBA1C 6.9 (A) 06/07/2024    HGBA1C 6.9 (A) 03/01/2024     /82 (BP Location: Left arm, Patient Position: Sitting)   Pulse 77   Temp 36.2 °C (97.1 °F)   Resp 16   Ht 1.727 m (5' 8\")   Wt 125 kg (276 lb)   BMI 41.97 kg/m²    Wt Readings from Last 5 Encounters:   01/03/25 125 kg (276 lb)   09/27/24 125 kg (275 lb)   07/22/24 125 kg (275 lb 9.6 oz)   06/07/24 127 kg (279 lb)   05/17/24 126 kg (278 lb 12.8 oz)          Lab Results   Component Value Date    ALBUMINUR 8.3 07/26/2024    CREATU 91.4 07/26/2024    MICROALBCREA 9.1 07/26/2024        Review of Systems   Constitutional: Negative.    HENT: Negative.     Respiratory: Negative.     Cardiovascular: Negative.    Gastrointestinal: Negative.    Endocrine: Negative.    Genitourinary: Negative.    Musculoskeletal: Negative.    Skin: Negative.    Neurological: Negative.    Psychiatric/Behavioral: Negative.          Physical Exam  Vitals and nursing note reviewed.   Constitutional:       Appearance: Normal appearance.   HENT:      Head: Normocephalic.   Eyes:      Pupils: " Pupils are equal, round, and reactive to light.   Cardiovascular:      Rate and Rhythm: Normal rate and regular rhythm.      Heart sounds: Normal heart sounds.   Pulmonary:      Effort: Pulmonary effort is normal.      Breath sounds: Normal breath sounds.   Skin:     General: Skin is warm and dry.   Neurological:      General: No focal deficit present.      Mental Status: He is alert and oriented to person, place, and time. Mental status is at baseline.   Psychiatric:         Mood and Affect: Mood normal.         Behavior: Behavior normal.         Thought Content: Thought content normal.         Judgment: Judgment normal.         Problem List Items Addressed This Visit             ICD-10-CM    Type 2 diabetes mellitus E11.9    Relevant Orders    POCT glycosylated hemoglobin (Hb A1C) manually resulted (Completed)    POCT fingerstick glucose manually resulted (Completed)     Other Visit Diagnoses         Codes    Annual physical exam    -  Primary Z00.00    Relevant Orders    Comprehensive metabolic panel    TSH with reflex to Free T4 if abnormal    CBC and Auto Differential    Elevated serum creatinine     R79.89    Relevant Orders    Comprehensive metabolic panel    Anemia, unspecified type     D64.9    Relevant Orders    CBC and Auto Differential    Iron and TIBC    Vitamin B12              Laura L Seaver, APRN-CNP

## 2025-01-03 NOTE — PROGRESS NOTES
CC:  follow up right achilles pain     HPI: Pt presents follow up right achilles tendonitis, finished the therapy and wearing the heel lifts, improved.        PCP: Dr. Bowen/Seaver  Last visit: 1-3-25     PMH  Medical History           Past Medical History:   Diagnosis Date    Anal fissure 04/03/2017    Cellulitis of right lower limb 09/14/2022     Cellulitis of right leg    COVID-19 virus infection 09/06/2022    Diabetes mellitus (Multi)      Diarrhea 09/06/2022    Fatty liver      High cholesterol      Hypertension      Nicotine use disorder 08/22/2022    Onychomycosis of toenail 07/22/2024    Small intestine cancer (Multi)           MEDS     Current Medications      Current Outpatient Medications:     blood sugar diagnostic (Accu-Chek Nai Plus test strp) strip, , Disp: , Rfl:     ergocalciferol (Vitamin D-2) 1.25 MG (22641 UT) capsule, Take 1 capsule (50,000 Units) by mouth every 30 (thirty) days. (Patient not taking: Reported on 7/22/2024), Disp: 12 capsule, Rfl: 2    glimepiride (Amaryl) 4 mg tablet, Take 1 tablet (4 mg) by mouth 2 times a day., Disp: 180 tablet, Rfl: 2    hydroCHLOROthiazide (HYDRODiuril) 25 mg tablet, Take 1 tablet (25 mg) by mouth once daily., Disp: 90 tablet, Rfl: 2    levothyroxine (Synthroid, Levoxyl) 137 mcg tablet, TAKE 1 TABLET BY MOUTH EVERY MORNING. On sundays only TAKE 1/2 (ONE-HALF) OF A TABLET. No food or drink for one hour after taking., Disp: 90 tablet, Rfl: 2    lisinopril 40 mg tablet, Take 1 tablet (40 mg) by mouth once daily., Disp: 90 tablet, Rfl: 2    pioglitazone (Actos) 30 mg tablet, Take 1 tablet (30 mg) by mouth once daily at bedtime., Disp: 90 tablet, Rfl: 2      Allergies  Allergies             Allergies   Allergen Reactions    Penicillins Unknown and Other       Syncope     Causes patient to pass out.     fainting    Metformin Unknown       diarr lsnp         Social History   Social History              Socioeconomic History    Marital status:    Tobacco  Use    Smoking status: Never    Smokeless tobacco: Never   Substance and Sexual Activity    Alcohol use: Not Currently    Drug use: Never    Sexual activity: Yes         Family History               Family History   Problem Relation Name Age of Onset    Diabetes Mother        Cancer Brother        Heart disease Brother        Heart disease Maternal Grandfather             Surgical History             Past Surgical History:   Procedure Laterality Date    OTHER SURGICAL HISTORY   10/10/2020     Gallbladder surgery    OTHER SURGICAL HISTORY   09/14/2022     Tonsillectomy    SMALL INTESTINE SURGERY                REVIEW OF SYSTEMS  DERM:   + as noted in HPI.         Physical examination:   On General Observation: Patient is a pleasant, cooperative, well developed 69 y.o. diabetic   adult male. The patient is alert and oriented to time, place and person. Patient has normal affect and mood.  There were no vitals taken for this visit.     Vascular:  DP and PT pulses are 2/4 b/l. mild edema noted. mild varicosities b/l.  CFT  5 seconds to all digits bilateral.  Skin temperature is warm to warm from proximal to distal bilateral.       Muscular: Strength is 5/5 for all instrinsic and extrinsic muscle groups.      Neuro:  Proprioception present.   Sensation to vibration is  present. Protective sensation intact  at all pedal sites via [place] Blanco 5.07 monofilament bilateral.  Light touch present bilateral.      Derm:    Left toenails: 1-5 Brittleness, crumbling upon debridement, subungual debris, elongation, mycotic appearance, tenderness, and thickness.   Right toenails: 1-5 Brittleness, crumbling upon debridement, subungual debris, elongation, mycotic appearance, tenderness, and thickness.   Hair growth is decreased b/l le     Ortho:  Minimal is present posterior heel insertion of the achiiles tendon, increase in AJ df is present with the knee flexed and extended.     ASSESSMENT:    Achiiles tendonitis right        PLAN:    Exam  Reviewed with pt, improved and finished the therapy, recommend to continue the heel lifts, increase to 1/4 inch, continue the hep per PT.  Follow up for dm foot exam 6 months.        Cooper Shoemaker DPM

## 2025-01-06 ENCOUNTER — TELEPHONE (OUTPATIENT)
Dept: PRIMARY CARE | Facility: CLINIC | Age: 70
End: 2025-01-06
Payer: MEDICARE

## 2025-01-06 NOTE — TELEPHONE ENCOUNTER
----- Message from Laura Seaver sent at 1/3/2025  6:39 PM EST -----  Pls inform  Vitamin b12 is low start over the counter vitamin b12 500 mcg daily. Kidney function is back to normal just he knows. Thyroid good no changes. lsnp

## 2025-01-10 ENCOUNTER — DOCUMENTATION (OUTPATIENT)
Dept: PHYSICAL THERAPY | Facility: CLINIC | Age: 70
End: 2025-01-10
Payer: MEDICARE

## 2025-01-10 DIAGNOSIS — M76.61 TENDONITIS, ACHILLES, RIGHT: Primary | ICD-10-CM

## 2025-01-10 NOTE — PROGRESS NOTES
Physical Therapy    Discharge Summary    Name: Jalil Camp  MRN: [id number]  : 1955  Date: 01/10/25    Discharge Summary: PT    Discharge Information: Date of last visit 24    Therapy Summary: PT COMPLETED 5/6 AUTHORIZED VISITS , DID NOT COMPLETE 6TH VISIT IN AUTHORIZED TIME FRAME(1/3/25)    Discharge Status: HEP     Rehab Discharge Reason: Achieved all and/or the most significant goals(s)

## 2025-04-04 ENCOUNTER — APPOINTMENT (OUTPATIENT)
Dept: PRIMARY CARE | Facility: CLINIC | Age: 70
End: 2025-04-04
Payer: MEDICARE

## 2025-04-04 VITALS
BODY MASS INDEX: 42.13 KG/M2 | DIASTOLIC BLOOD PRESSURE: 83 MMHG | RESPIRATION RATE: 16 BRPM | HEART RATE: 75 BPM | HEIGHT: 68 IN | SYSTOLIC BLOOD PRESSURE: 151 MMHG | WEIGHT: 278 LBS

## 2025-04-04 DIAGNOSIS — E66.01 CLASS 3 SEVERE OBESITY DUE TO EXCESS CALORIES WITH SERIOUS COMORBIDITY AND BODY MASS INDEX (BMI) OF 40.0 TO 44.9 IN ADULT: ICD-10-CM

## 2025-04-04 DIAGNOSIS — E66.813 CLASS 3 SEVERE OBESITY DUE TO EXCESS CALORIES WITH SERIOUS COMORBIDITY AND BODY MASS INDEX (BMI) OF 40.0 TO 44.9 IN ADULT: ICD-10-CM

## 2025-04-04 DIAGNOSIS — E03.9 HYPOTHYROIDISM, UNSPECIFIED TYPE: ICD-10-CM

## 2025-04-04 DIAGNOSIS — I10 ESSENTIAL HYPERTENSION: ICD-10-CM

## 2025-04-04 DIAGNOSIS — E11.65 TYPE 2 DIABETES MELLITUS WITH HYPERGLYCEMIA, WITHOUT LONG-TERM CURRENT USE OF INSULIN: Primary | ICD-10-CM

## 2025-04-04 LAB
POC FINGERSTICK BLOOD GLUCOSE: 198 MG/DL (ref 70–100)
POC HEMOGLOBIN A1C: 6.9 % (ref 4.2–6.5)

## 2025-04-04 PROCEDURE — 3079F DIAST BP 80-89 MM HG: CPT | Performed by: NURSE PRACTITIONER

## 2025-04-04 PROCEDURE — 4010F ACE/ARB THERAPY RXD/TAKEN: CPT | Performed by: NURSE PRACTITIONER

## 2025-04-04 PROCEDURE — 3008F BODY MASS INDEX DOCD: CPT | Performed by: NURSE PRACTITIONER

## 2025-04-04 PROCEDURE — 1160F RVW MEDS BY RX/DR IN RCRD: CPT | Performed by: NURSE PRACTITIONER

## 2025-04-04 PROCEDURE — 1159F MED LIST DOCD IN RCRD: CPT | Performed by: NURSE PRACTITIONER

## 2025-04-04 PROCEDURE — 1123F ACP DISCUSS/DSCN MKR DOCD: CPT | Performed by: NURSE PRACTITIONER

## 2025-04-04 PROCEDURE — 3077F SYST BP >= 140 MM HG: CPT | Performed by: NURSE PRACTITIONER

## 2025-04-04 PROCEDURE — 82962 GLUCOSE BLOOD TEST: CPT | Performed by: NURSE PRACTITIONER

## 2025-04-04 PROCEDURE — 1036F TOBACCO NON-USER: CPT | Performed by: NURSE PRACTITIONER

## 2025-04-04 PROCEDURE — 99213 OFFICE O/P EST LOW 20 MIN: CPT | Performed by: NURSE PRACTITIONER

## 2025-04-04 PROCEDURE — 83036 HEMOGLOBIN GLYCOSYLATED A1C: CPT | Performed by: NURSE PRACTITIONER

## 2025-04-04 ASSESSMENT — ENCOUNTER SYMPTOMS
GASTROINTESTINAL NEGATIVE: 1
CONSTITUTIONAL NEGATIVE: 1
NEUROLOGICAL NEGATIVE: 1
MUSCULOSKELETAL NEGATIVE: 1
RESPIRATORY NEGATIVE: 1
PSYCHIATRIC NEGATIVE: 1
ENDOCRINE NEGATIVE: 1
CARDIOVASCULAR NEGATIVE: 1

## 2025-04-04 NOTE — PROGRESS NOTES
" Jalil Camp is a 69 y.o. here for a diabetic follow up exam.     Pt states they are doing well. Following a low carbohydrate diet and is active.     Up to date with eye and foot exams, pcp visits.     Working with hem/onc and mri is pending. Dr chapincito urbina h+p in june     Aic is 6.9%     Meds:   Glimepiride 4mg po bid   Actos 30mg po daily        Lab Results   Component Value Date    POCGLU 198 (A) 04/04/2025    POCGLU 173 (A) 01/03/2025    POCGLU 204 (A) 09/27/2024    POCGLU 197 (A) 06/07/2024    POCGLU 158 (A) 03/01/2024    HGBA1C 6.9 (A) 04/04/2025    HGBA1C 6.7 (A) 01/03/2025    HGBA1C 6.5 09/27/2024    HGBA1C 7.0 (H) 07/26/2024    HGBA1C 6.9 (A) 06/07/2024     /83   Pulse 75   Resp 16   Ht 1.727 m (5' 8\")   Wt 126 kg (278 lb)   BMI 42.27 kg/m²    Wt Readings from Last 5 Encounters:   04/04/25 126 kg (278 lb)   01/03/25 125 kg (276 lb)   09/27/24 125 kg (275 lb)   07/22/24 125 kg (275 lb 9.6 oz)   06/07/24 127 kg (279 lb)          Lab Results   Component Value Date    ALBUMINUR 8.3 07/26/2024    CREATU 91.4 07/26/2024    MICROALBCREA 9.1 07/26/2024        Review of Systems   Constitutional: Negative.    HENT: Negative.     Respiratory: Negative.     Cardiovascular: Negative.    Gastrointestinal: Negative.    Endocrine: Negative.    Genitourinary: Negative.    Musculoskeletal: Negative.    Skin: Negative.    Neurological: Negative.    Psychiatric/Behavioral: Negative.          Physical Exam  Vitals and nursing note reviewed.   Constitutional:       Appearance: Normal appearance.   HENT:      Head: Normocephalic.   Eyes:      Pupils: Pupils are equal, round, and reactive to light.   Cardiovascular:      Rate and Rhythm: Normal rate and regular rhythm.      Heart sounds: Normal heart sounds.   Pulmonary:      Effort: Pulmonary effort is normal.      Breath sounds: Normal breath sounds.   Skin:     General: Skin is warm and dry.   Neurological:      General: No focal deficit present.      " Mental Status: He is alert and oriented to person, place, and time. Mental status is at baseline.   Psychiatric:         Mood and Affect: Mood normal.         Behavior: Behavior normal.         Thought Content: Thought content normal.         Judgment: Judgment normal.        Problem List Items Addressed This Visit             ICD-10-CM    Essential hypertension I10    Hypothyroidism E03.9    Type 2 diabetes mellitus - Primary E11.9    Relevant Orders    POCT glycosylated hemoglobin (Hb A1C) manually resulted (Completed)    POCT fingerstick glucose manually resulted (Completed)     Other Visit Diagnoses         Codes    Class 3 severe obesity due to excess calories with serious comorbidity and body mass index (BMI) of 40.0 to 44.9 in adult     E66.813, E66.01, Z68.41           Laura L Seaver, APRN-CNP

## 2025-06-02 ENCOUNTER — APPOINTMENT (OUTPATIENT)
Dept: PRIMARY CARE | Facility: CLINIC | Age: 70
End: 2025-06-02
Payer: MEDICARE

## 2025-06-02 VITALS
SYSTOLIC BLOOD PRESSURE: 122 MMHG | OXYGEN SATURATION: 97 % | HEART RATE: 82 BPM | HEIGHT: 68 IN | BODY MASS INDEX: 42.31 KG/M2 | DIASTOLIC BLOOD PRESSURE: 80 MMHG | TEMPERATURE: 97.1 F | WEIGHT: 279.2 LBS

## 2025-06-02 DIAGNOSIS — E11.00 TYPE 2 DIABETES MELLITUS WITH HYPEROSMOLARITY WITHOUT COMA, WITHOUT LONG-TERM CURRENT USE OF INSULIN (MULTI): Primary | ICD-10-CM

## 2025-06-02 DIAGNOSIS — K76.9 LIVER LESION: ICD-10-CM

## 2025-06-02 DIAGNOSIS — I10 ESSENTIAL HYPERTENSION: ICD-10-CM

## 2025-06-02 PROCEDURE — 3074F SYST BP LT 130 MM HG: CPT | Performed by: FAMILY MEDICINE

## 2025-06-02 PROCEDURE — 1159F MED LIST DOCD IN RCRD: CPT | Performed by: FAMILY MEDICINE

## 2025-06-02 PROCEDURE — 3008F BODY MASS INDEX DOCD: CPT | Performed by: FAMILY MEDICINE

## 2025-06-02 PROCEDURE — 99213 OFFICE O/P EST LOW 20 MIN: CPT | Performed by: FAMILY MEDICINE

## 2025-06-02 PROCEDURE — 1036F TOBACCO NON-USER: CPT | Performed by: FAMILY MEDICINE

## 2025-06-02 PROCEDURE — 3044F HG A1C LEVEL LT 7.0%: CPT | Performed by: FAMILY MEDICINE

## 2025-06-02 PROCEDURE — 3079F DIAST BP 80-89 MM HG: CPT | Performed by: FAMILY MEDICINE

## 2025-06-02 PROCEDURE — 4010F ACE/ARB THERAPY RXD/TAKEN: CPT | Performed by: FAMILY MEDICINE

## 2025-06-02 RX ORDER — LATANOPROST 50 UG/ML
1 SOLUTION/ DROPS OPHTHALMIC NIGHTLY
COMMUNITY
Start: 2025-05-05

## 2025-06-04 LAB
ALBUMIN SERPL-MCNC: 4.2 G/DL (ref 3.6–5.1)
ALBUMIN/CREAT UR: 10 MG/G CREAT
ALP SERPL-CCNC: 57 U/L (ref 35–144)
ALT SERPL-CCNC: 9 U/L (ref 9–46)
ANION GAP SERPL CALCULATED.4IONS-SCNC: 7 MMOL/L (CALC) (ref 7–17)
APPEARANCE UR: ABNORMAL
AST SERPL-CCNC: 12 U/L (ref 10–35)
BACTERIA #/AREA URNS HPF: ABNORMAL /HPF
BILIRUB SERPL-MCNC: 1.2 MG/DL (ref 0.2–1.2)
BILIRUB UR QL STRIP: NEGATIVE
BUN SERPL-MCNC: 25 MG/DL (ref 7–25)
CALCIUM SERPL-MCNC: 9.4 MG/DL (ref 8.6–10.3)
CHLORIDE SERPL-SCNC: 103 MMOL/L (ref 98–110)
CO2 SERPL-SCNC: 28 MMOL/L (ref 20–32)
COLOR UR: YELLOW
CREAT SERPL-MCNC: 1.38 MG/DL (ref 0.7–1.35)
CREAT UR-MCNC: 159 MG/DL (ref 20–320)
EGFRCR SERPLBLD CKD-EPI 2021: 55 ML/MIN/1.73M2
GLUCOSE SERPL-MCNC: 144 MG/DL (ref 65–99)
GLUCOSE UR QL STRIP: NEGATIVE
HGB UR QL STRIP: NEGATIVE
HYALINE CASTS #/AREA URNS LPF: ABNORMAL /LPF
KETONES UR QL STRIP: NEGATIVE
LEUKOCYTE ESTERASE UR QL STRIP: ABNORMAL
MICROALBUMIN UR-MCNC: 1.6 MG/DL
NITRITE UR QL STRIP: NEGATIVE
PH UR STRIP: 5.5 [PH] (ref 5–8)
POTASSIUM SERPL-SCNC: 4.4 MMOL/L (ref 3.5–5.3)
PROT SERPL-MCNC: 7.2 G/DL (ref 6.1–8.1)
PROT UR QL STRIP: NEGATIVE
RBC #/AREA URNS HPF: ABNORMAL /HPF
SERVICE CMNT-IMP: ABNORMAL
SODIUM SERPL-SCNC: 138 MMOL/L (ref 135–146)
SP GR UR STRIP: 1.02 (ref 1–1.03)
SQUAMOUS #/AREA URNS HPF: ABNORMAL /HPF
WBC #/AREA URNS HPF: ABNORMAL /HPF

## 2025-07-07 ENCOUNTER — APPOINTMENT (OUTPATIENT)
Dept: PODIATRY | Facility: CLINIC | Age: 70
End: 2025-07-07
Payer: MEDICARE

## 2025-07-07 DIAGNOSIS — Z79.84 LONG TERM CURRENT USE OF ORAL HYPOGLYCEMIC DRUG: ICD-10-CM

## 2025-07-07 DIAGNOSIS — M79.674 TOE PAIN, RIGHT: ICD-10-CM

## 2025-07-07 DIAGNOSIS — B35.1 TINEA UNGUIUM: Primary | ICD-10-CM

## 2025-07-07 DIAGNOSIS — M79.675 TOE PAIN, LEFT: ICD-10-CM

## 2025-07-07 DIAGNOSIS — E11.9 DIABETES MELLITUS WITHOUT COMPLICATION: ICD-10-CM

## 2025-07-07 PROCEDURE — 3044F HG A1C LEVEL LT 7.0%: CPT | Performed by: PODIATRIST

## 2025-07-07 PROCEDURE — 11721 DEBRIDE NAIL 6 OR MORE: CPT | Performed by: PODIATRIST

## 2025-07-07 PROCEDURE — 4010F ACE/ARB THERAPY RXD/TAKEN: CPT | Performed by: PODIATRIST

## 2025-07-07 NOTE — PROGRESS NOTES
CC:  painful thickened and elongated toenails and diabetic care.        HPI: Pt presents for dm foot exam and painful thickened and elongated toenails that are difficult to manage.  Onset was gradual with worsening course until recently.  Aggravated by shoe gear and ambulation. Aic 6.8     PCP: Dr. Bowen  Last visit: 6-2-25     PMH  Medical History           Past Medical History:   Diagnosis Date    Anal fissure 04/03/2017    Cellulitis of right lower limb 09/14/2022     Cellulitis of right leg    COVID-19 virus infection 09/06/2022    Diabetes mellitus (Multi)      Diarrhea 09/06/2022    Fatty liver      High cholesterol      Hypertension      Nicotine use disorder 08/22/2022    Onychomycosis of toenail 07/22/2024    Small intestine cancer (Multi)           MEDS     Current Medications      Current Outpatient Medications:     blood sugar diagnostic (Accu-Chek Nai Plus test strp) strip, , Disp: , Rfl:     ergocalciferol (Vitamin D-2) 1.25 MG (50264 UT) capsule, Take 1 capsule (50,000 Units) by mouth every 30 (thirty) days. (Patient not taking: Reported on 7/22/2024), Disp: 12 capsule, Rfl: 2    glimepiride (Amaryl) 4 mg tablet, Take 1 tablet (4 mg) by mouth 2 times a day., Disp: 180 tablet, Rfl: 2    hydroCHLOROthiazide (HYDRODiuril) 25 mg tablet, Take 1 tablet (25 mg) by mouth once daily., Disp: 90 tablet, Rfl: 2    levothyroxine (Synthroid, Levoxyl) 137 mcg tablet, TAKE 1 TABLET BY MOUTH EVERY MORNING. On sundays only TAKE 1/2 (ONE-HALF) OF A TABLET. No food or drink for one hour after taking., Disp: 90 tablet, Rfl: 2    lisinopril 40 mg tablet, Take 1 tablet (40 mg) by mouth once daily., Disp: 90 tablet, Rfl: 2    pioglitazone (Actos) 30 mg tablet, Take 1 tablet (30 mg) by mouth once daily at bedtime., Disp: 90 tablet, Rfl: 2      Allergies  Allergies             Allergies   Allergen Reactions    Penicillins Unknown and Other       Syncope     Causes patient to pass out.     fainting    Metformin Unknown        diarr lsnp         Social History   Social History              Socioeconomic History    Marital status:    Tobacco Use    Smoking status: Never    Smokeless tobacco: Never   Substance and Sexual Activity    Alcohol use: Not Currently    Drug use: Never    Sexual activity: Yes         Family History               Family History   Problem Relation Name Age of Onset    Diabetes Mother        Cancer Brother        Heart disease Brother        Heart disease Maternal Grandfather             Surgical History             Past Surgical History:   Procedure Laterality Date    OTHER SURGICAL HISTORY   10/10/2020     Gallbladder surgery    OTHER SURGICAL HISTORY   09/14/2022     Tonsillectomy    SMALL INTESTINE SURGERY                REVIEW OF SYSTEMS  DERM:   + as noted in HPI.         Physical examination:   On General Observation: Patient is a pleasant, cooperative, well developed 69 y.o. diabetic   adult male. The patient is alert and oriented to time, place and person. Patient has normal affect and mood.  There were no vitals taken for this visit.     Vascular:  DP and PT pulses are 2/4 b/l. mild edema noted. mild varicosities b/l.  CFT  5 seconds to all digits bilateral.  Skin temperature is warm to warm from proximal to distal bilateral.       Muscular: Strength is 5/5 for all instrinsic and extrinsic muscle groups.      Neuro:  Proprioception present.   Sensation to vibration is  present. Protective sensation intact  at all pedal sites via [place] 5.07 monofilament bilateral.  Light touch present bilateral.      Derm:    Left toenails: 1-5 Brittleness, crumbling upon debridement, subungual debris, elongation, mycotic appearance, tenderness, and thickness.   Right toenails: 1-5 Brittleness, crumbling upon debridement, subungual debris, elongation, mycotic appearance, tenderness, and thickness.   Hair growth is decreased b/l le     Ortho:  Decreased pain is present posterior heel insertion of the achiiles  tendon, decreased in AJ df is present with the knee flexed and extnded.     ASSESSMENT:      Tinea Unguium [B35.1]   DM  Pain in right toe(s) [M79.674]   Pain in left toe(s) [M79.675]         PLAN:     A comprehensive history and physical examination were preformed. DM foot care and DM foot manifestations were reviewed.  The patient was educated on clinical findings, diagnosis and treatment plans. Patient understands all that has been explained and all questions were answered to apparent satisfaction.      - Debrided toenails 1-10 in length and height.   - Follow up in 9-12 weeks.         Cooper Shoemaker DPM

## 2025-07-11 ENCOUNTER — APPOINTMENT (OUTPATIENT)
Dept: PRIMARY CARE | Facility: CLINIC | Age: 70
End: 2025-07-11
Payer: MEDICARE

## 2025-07-11 VITALS
DIASTOLIC BLOOD PRESSURE: 84 MMHG | BODY MASS INDEX: 42.13 KG/M2 | OXYGEN SATURATION: 96 % | HEART RATE: 73 BPM | TEMPERATURE: 97.5 F | WEIGHT: 278 LBS | SYSTOLIC BLOOD PRESSURE: 143 MMHG | HEIGHT: 68 IN

## 2025-07-11 DIAGNOSIS — D3A.8 NEUROENDOCRINE NEOPLASM OF SMALL INTESTINE: ICD-10-CM

## 2025-07-11 DIAGNOSIS — E66.813 CLASS 3 SEVERE OBESITY DUE TO EXCESS CALORIES WITH SERIOUS COMORBIDITY AND BODY MASS INDEX (BMI) OF 40.0 TO 44.9 IN ADULT: ICD-10-CM

## 2025-07-11 DIAGNOSIS — E03.9 HYPOTHYROIDISM, UNSPECIFIED TYPE: ICD-10-CM

## 2025-07-11 DIAGNOSIS — I10 ESSENTIAL HYPERTENSION: ICD-10-CM

## 2025-07-11 DIAGNOSIS — E11.65 TYPE 2 DIABETES MELLITUS WITH HYPERGLYCEMIA, WITHOUT LONG-TERM CURRENT USE OF INSULIN: Primary | ICD-10-CM

## 2025-07-11 LAB
POC FINGERSTICK BLOOD GLUCOSE: 133 MG/DL (ref 70–100)
POC HEMOGLOBIN A1C: 6.6 % (ref 4.2–6.5)

## 2025-07-11 PROCEDURE — 3044F HG A1C LEVEL LT 7.0%: CPT | Performed by: NURSE PRACTITIONER

## 2025-07-11 PROCEDURE — 1160F RVW MEDS BY RX/DR IN RCRD: CPT | Performed by: NURSE PRACTITIONER

## 2025-07-11 PROCEDURE — 3079F DIAST BP 80-89 MM HG: CPT | Performed by: NURSE PRACTITIONER

## 2025-07-11 PROCEDURE — 82962 GLUCOSE BLOOD TEST: CPT | Performed by: NURSE PRACTITIONER

## 2025-07-11 PROCEDURE — 1159F MED LIST DOCD IN RCRD: CPT | Performed by: NURSE PRACTITIONER

## 2025-07-11 PROCEDURE — 3008F BODY MASS INDEX DOCD: CPT | Performed by: NURSE PRACTITIONER

## 2025-07-11 PROCEDURE — 1036F TOBACCO NON-USER: CPT | Performed by: NURSE PRACTITIONER

## 2025-07-11 PROCEDURE — 4010F ACE/ARB THERAPY RXD/TAKEN: CPT | Performed by: NURSE PRACTITIONER

## 2025-07-11 PROCEDURE — 99213 OFFICE O/P EST LOW 20 MIN: CPT | Performed by: NURSE PRACTITIONER

## 2025-07-11 PROCEDURE — 83036 HEMOGLOBIN GLYCOSYLATED A1C: CPT | Performed by: NURSE PRACTITIONER

## 2025-07-11 PROCEDURE — 3077F SYST BP >= 140 MM HG: CPT | Performed by: NURSE PRACTITIONER

## 2025-07-11 RX ORDER — ERGOCALCIFEROL 1.25 MG/1
50000 CAPSULE ORAL
Qty: 12 CAPSULE | Refills: 2 | Status: SHIPPED | OUTPATIENT
Start: 2025-07-11

## 2025-07-11 RX ORDER — LEVOTHYROXINE SODIUM 137 UG/1
TABLET ORAL
Qty: 90 TABLET | Refills: 2 | Status: SHIPPED | OUTPATIENT
Start: 2025-07-11

## 2025-07-11 RX ORDER — PIOGLITAZONE 30 MG/1
30 TABLET ORAL NIGHTLY
Qty: 90 TABLET | Refills: 2 | Status: SHIPPED | OUTPATIENT
Start: 2025-07-11

## 2025-07-11 RX ORDER — LISINOPRIL 40 MG/1
40 TABLET ORAL DAILY
Qty: 90 TABLET | Refills: 2 | Status: SHIPPED | OUTPATIENT
Start: 2025-07-11

## 2025-07-11 RX ORDER — HYDROCHLOROTHIAZIDE 25 MG/1
25 TABLET ORAL DAILY
Qty: 90 TABLET | Refills: 2 | Status: SHIPPED | OUTPATIENT
Start: 2025-07-11

## 2025-07-11 RX ORDER — GLIMEPIRIDE 4 MG/1
4 TABLET ORAL 2 TIMES DAILY
Qty: 180 TABLET | Refills: 2 | Status: SHIPPED | OUTPATIENT
Start: 2025-07-11

## 2025-07-11 ASSESSMENT — ENCOUNTER SYMPTOMS
CARDIOVASCULAR NEGATIVE: 1
RESPIRATORY NEGATIVE: 1
MUSCULOSKELETAL NEGATIVE: 1
ENDOCRINE NEGATIVE: 1
NEUROLOGICAL NEGATIVE: 1
CONSTITUTIONAL NEGATIVE: 1
PSYCHIATRIC NEGATIVE: 1
GASTROINTESTINAL NEGATIVE: 1

## 2025-07-11 ASSESSMENT — PATIENT HEALTH QUESTIONNAIRE - PHQ9
1. LITTLE INTEREST OR PLEASURE IN DOING THINGS: NOT AT ALL
2. FEELING DOWN, DEPRESSED OR HOPELESS: NOT AT ALL
SUM OF ALL RESPONSES TO PHQ9 QUESTIONS 1 AND 2: 0

## 2025-07-11 NOTE — ASSESSMENT & PLAN NOTE
Orders:    levothyroxine (Synthroid, Levoxyl) 137 mcg tablet; TAKE 1 TABLET BY MOUTH EVERY MORNING. On sundays only TAKE 1/2 (ONE-HALF) OF A TABLET. No food or drink for one hour after taking.    ergocalciferol (Vitamin D-2) 1250 mcg (50,000 units) capsule; Take 1 capsule (50,000 Units) by mouth every 30 (thirty) days.

## 2025-07-11 NOTE — ASSESSMENT & PLAN NOTE
Orders:    POCT glycosylated hemoglobin (Hb A1C) manually resulted    POCT fingerstick glucose manually resulted    glimepiride (Amaryl) 4 mg tablet; Take 1 tablet (4 mg) by mouth 2 times a day.    pioglitazone (Actos) 30 mg tablet; Take 1 tablet (30 mg) by mouth once daily at bedtime.    lisinopril 40 mg tablet; Take 1 tablet (40 mg) by mouth once daily.    hydroCHLOROthiazide (HYDRODiuril) 25 mg tablet; Take 1 tablet (25 mg) by mouth once daily.    ergocalciferol (Vitamin D-2) 1250 mcg (50,000 units) capsule; Take 1 capsule (50,000 Units) by mouth every 30 (thirty) days.

## 2025-07-11 NOTE — PROGRESS NOTES
" Jalil Camp is a 70 y.o. here for a diabetic follow up exam.     Pt states they are doing well. Following a low carbohydrate diet and is active.     Up to date with eye and foot exams, pcp visits.     Hem/onc blood tests and urine tests and the replacement provider (dr eng left the practice) has consulted [facility]/will be having to see another specialist .    Meds:  Glimepiride 4mg po bid  Actos 30mg qhs  Off metformin - gi intol    Lost another lb - 1 lb    Bmi 42.27   Aic is 6.6 last 6.9     No changes f/u 3mo        Patient was counseled and educated about blood glucose targets and aic goal, carb control and meal planning, medication options, benefits and side effects, self monitoring of blood glucose if applicable, call parameters, proper subcutaneous injection techniques if applicable, and weight loss. Be active.     If we are starting a gip/glp combo or glp, patient will be sure to hydrate daily 64+oz of h20, and monitor stools. Call if any vomiting or intolerable side effects.      Lab Results   Component Value Date    POCGLU 133 (A) 07/11/2025    POCGLU 198 (A) 04/04/2025    POCGLU 173 (A) 01/03/2025    POCGLU 204 (A) 09/27/2024    POCGLU 197 (A) 06/07/2024    POCGLU 158 (A) 03/01/2024    POCGLU 125 (A) 12/01/2023    HGBA1C 6.6 (A) 07/11/2025    HGBA1C 6.9 (A) 04/04/2025    HGBA1C 6.7 (A) 01/03/2025    HGBA1C 6.5 09/27/2024    HGBA1C 7.0 (H) 07/26/2024    HGBA1C 6.9 (A) 06/07/2024    HGBA1C 6.9 (A) 03/01/2024    HGBA1C 6.6 (A) 12/01/2023     /84   Pulse 73   Temp 36.4 °C (97.5 °F)   Ht 1.727 m (5' 8\")   Wt 126 kg (278 lb)   SpO2 96%   BMI 42.27 kg/m²    Wt Readings from Last 5 Encounters:   07/11/25 126 kg (278 lb)   06/02/25 127 kg (279 lb 3.2 oz)   04/04/25 126 kg (278 lb)   01/03/25 125 kg (276 lb)   09/27/24 125 kg (275 lb)          Lab Results   Component Value Date    ALBUMINUR 1.6 06/03/2025    CREATU 159 06/03/2025    CREATU 91.4 07/26/2024    MICROALBCREA 10 06/03/2025        Review of " Systems   Constitutional: Negative.    HENT: Negative.     Respiratory: Negative.     Cardiovascular: Negative.    Gastrointestinal: Negative.    Endocrine: Negative.    Genitourinary: Negative.    Musculoskeletal: Negative.    Skin: Negative.    Neurological: Negative.    Psychiatric/Behavioral: Negative.          Physical Exam  Vitals and nursing note reviewed.   Constitutional:       Appearance: Normal appearance.   HENT:      Head: Normocephalic.   Eyes:      Pupils: Pupils are equal, round, and reactive to light.   Cardiovascular:      Rate and Rhythm: Normal rate and regular rhythm.      Heart sounds: Normal heart sounds.   Pulmonary:      Effort: Pulmonary effort is normal.      Breath sounds: Normal breath sounds.   Skin:     General: Skin is warm and dry.   Neurological:      General: No focal deficit present.      Mental Status: He is alert and oriented to person, place, and time. Mental status is at baseline.   Psychiatric:         Mood and Affect: Mood normal.         Behavior: Behavior normal.         Thought Content: Thought content normal.         Judgment: Judgment normal.          Assessment & Plan  Type 2 diabetes mellitus with hyperglycemia, without long-term current use of insulin    Orders:    POCT glycosylated hemoglobin (Hb A1C) manually resulted    POCT fingerstick glucose manually resulted    glimepiride (Amaryl) 4 mg tablet; Take 1 tablet (4 mg) by mouth 2 times a day.    pioglitazone (Actos) 30 mg tablet; Take 1 tablet (30 mg) by mouth once daily at bedtime.    lisinopril 40 mg tablet; Take 1 tablet (40 mg) by mouth once daily.    hydroCHLOROthiazide (HYDRODiuril) 25 mg tablet; Take 1 tablet (25 mg) by mouth once daily.    ergocalciferol (Vitamin D-2) 1250 mcg (50,000 units) capsule; Take 1 capsule (50,000 Units) by mouth every 30 (thirty) days.    Hypothyroidism, unspecified type    Orders:    levothyroxine (Synthroid, Levoxyl) 137 mcg tablet; TAKE 1 TABLET BY MOUTH EVERY MORNING. On  sundays only TAKE 1/2 (ONE-HALF) OF A TABLET. No food or drink for one hour after taking.    ergocalciferol (Vitamin D-2) 1250 mcg (50,000 units) capsule; Take 1 capsule (50,000 Units) by mouth every 30 (thirty) days.    Essential hypertension         Class 3 severe obesity due to excess calories with serious comorbidity and body mass index (BMI) of 40.0 to 44.9 in adult         Neuroendocrine neoplasm of small intestine              Laura L Seaver, APRN-CNP     I spent a total of documented minutes on the date of service which included preparing to see the patient, face-to-face patient care, completing clinical documentation. Obtained and/or reviewed separately obtained history, counseling and education the patient/family/caregiver, and ordering medications, refills, tests, procedure or consults to specialists.

## 2025-10-10 ENCOUNTER — APPOINTMENT (OUTPATIENT)
Dept: PRIMARY CARE | Facility: CLINIC | Age: 70
End: 2025-10-10
Payer: MEDICARE

## 2026-01-09 ENCOUNTER — APPOINTMENT (OUTPATIENT)
Dept: PODIATRY | Facility: CLINIC | Age: 71
End: 2026-01-09
Payer: MEDICARE